# Patient Record
Sex: FEMALE | Race: WHITE | URBAN - METROPOLITAN AREA
[De-identification: names, ages, dates, MRNs, and addresses within clinical notes are randomized per-mention and may not be internally consistent; named-entity substitution may affect disease eponyms.]

---

## 2017-04-20 ENCOUNTER — PRE VISIT (OUTPATIENT)
Dept: OTOLARYNGOLOGY | Facility: CLINIC | Age: 52
End: 2017-04-20

## 2017-04-20 DIAGNOSIS — H91.90 HEARING LOSS: Primary | ICD-10-CM

## 2017-04-20 DIAGNOSIS — H80.90 OTOSCLEROSIS: Primary | ICD-10-CM

## 2017-04-20 NOTE — TELEPHONE ENCOUNTER
1.  Date/reason for appt:  4/26/17   Left Ear Hearing Loss    2.  Referring provider:  Self    3.  Call to patient (Yes / No - short description):  No,  indicates hasn't been seen elsewhere.  2006 Operative Report Dr oJseph  --  Records  are in [facility] and imaging is in PACS.

## 2017-04-26 ENCOUNTER — OFFICE VISIT (OUTPATIENT)
Dept: AUDIOLOGY | Facility: CLINIC | Age: 52
End: 2017-04-26

## 2017-04-26 ENCOUNTER — OFFICE VISIT (OUTPATIENT)
Dept: OTOLARYNGOLOGY | Facility: CLINIC | Age: 52
End: 2017-04-26

## 2017-04-26 VITALS — WEIGHT: 175 LBS | HEIGHT: 63 IN | BODY MASS INDEX: 31.01 KG/M2

## 2017-04-26 DIAGNOSIS — H90.6 MIXED HEARING LOSS, BILATERAL: Primary | ICD-10-CM

## 2017-04-26 DIAGNOSIS — H80.93 OTOSCLEROSIS, BILATERAL: Primary | ICD-10-CM

## 2017-04-26 RX ORDER — CALCIUM CARBONATE 500(1250)
500 TABLET ORAL 2 TIMES DAILY
COMMUNITY
End: 2017-05-12

## 2017-04-26 ASSESSMENT — PAIN SCALES - GENERAL: PAINLEVEL: NO PAIN (0)

## 2017-04-26 NOTE — MR AVS SNAPSHOT
After Visit Summary   2017    Nancy Elizondo    MRN: [id number]           Patient Information     Date Of Birth          1965        Visit Information        Provider Department      2017 8:00 AM Madyson Ochoa AuD [facility]        Today's Diagnoses     Mixed hearing loss, bilateral    -  1       Follow-ups after your visit        Who to contact     Please call your clinic at [phone] to:    Ask questions about your health    Make or cancel appointments    Discuss your medicines    Learn about your test results    Speak to your doctor   If you have compliments or concerns about an experience at your clinic, or if you wish to file a complaint, please contact [facility] Patient Relations at [phone] or email us at [email or web address]         Additional Information About Your Visit        MyChart Information     [employer] is an electronic gateway that provides easy, online access to your medical records. With TelemetryWeb, you can request a clinic appointment, read your test results, renew a prescription or communicate with your care team.     To sign up for [employer] visit the website at [email or web address]   You will be asked to enter the access code listed below, as well as some personal information. Please follow the directions to create your username and password.     Your access code is: [email or web address]  Expires: 2017  6:31 AM     Your access code will  in 90 days. If you need help or a new code, please contact your [facility] or call [phone] for assistance.        Care EveryWhere ID     This is your Care EveryWhere ID. This could be used by other organizations to access your [place] medical records  [id number]         Blood Pressure from Last 3 Encounters:   No data found for BP    Weight from Last 3 Encounters:   17 79.4 kg (175 lb)              We Performed the  Following     AUDIOGRAM/TYMPANOGRAM - INTERFACE     AUDIOLOGY ADULT REFERRAL     Cmpn Audiometry Thrshld Eval & Speech Recog (31804)     Reduced 52 - Tymps / Reflex   (29310)        Primary Care Provider    None Specified       No primary provider on file.        Thank you!     Thank you for choosing [facility]IOLOG[facility]  for your care. Our goal is always to provide you with excellent care. Hearing back from our patients is one way we can continue to improve our services. Please take a few minutes to complete the written survey that you may receive in the mail after your visit with us. Thank you!             Your Updated Medication List - Protect others around you: Learn how to safely use, store and throw away your medicines at www.disposemymeds.org.          This list is accurate as of: 4/26/17  9:09 AM.  Always use your most recent med list.                   Brand Name Dispense Instructions for use    CAFFEINE EXTRA STRENGTH PO      Take 200 mg by mouth       calcium carbonate 500 MG tablet    OS-DAYNA 500 mg [place]. Ca     Take 500 mg by mouth 2 times daily       EFFEXOR PO      Take 75 mg by mouth daily       IRON SUPPLEMENT PO          LISINOPRIL PO      Take 20 mg by mouth       MULTIVITAMIN ADULT PO          PROVIGIL PO      Take 200 mg by mouth 3 times daily

## 2017-04-26 NOTE — MR AVS SNAPSHOT
After Visit Summary   4/26/2017    Nancy Elizondo    MRN: [id number]           Patient Information     Date Of Birth          1965        Visit Information        Provider Department      4/26/2017 9:00 AM Suleiman Joseph MD [facility]        Today's Diagnoses     Otosclerosis    -  1      Care Instructions    You will have a CT done-- first floor. You will be called with the results.  Patient to review pre operative information.   Patient to schedule a pre-op physical with their primary MD or with our Preoperative Assessment Clinic within 30 days of the procedure.    Patient to avoid blood thinning medications 1 week prior to surgery (Ibuprofen, Aleve, Aspirin, fish oil )   Use the antiseptic scrub as directed.   You will need to schedule a post operative appointment for 3 weeks after surgery    Patient to call the ENT clinic with further questions or concerns:   ENT Triage Nurse  [phone] option 3  ENT appointment scheduling [phone] option 1  ENT surgery scheduling, [place] [phone]  ENT Nurse Carmen [phone]        Follow-ups after your visit        Your next 10 appointments already scheduled     Apr 26, 2017  9:40 AM CDT   CT TEMPORAL ORBITAL SELLA W/O CONTRAST with UCCT2   [facility] CT ([facility])    [address]  1st Floor  [place] [address] [phone]           Please bring any scans or X-rays taken at other hospitals, if similar tests were done. Also bring a list of your medicines, including vitamins, minerals and over-the-counter drugs. It is safest to leave personal items at home.  Be sure to tell your doctor:   If you have any allergies.   If there s any chance you are pregnant.   If you are breastfeeding.   If you have any special needs.  You do not need to do anything special to prepare.  Please wear loose clothing, such as a sweat suit or jogging clothes. Avoid snaps, zippers and other  "metal. We may ask you to undress and put on a hospital gown.              Future tests that were ordered for you today     Open Future Orders        Priority Expected Expires Ordered    CT Temporal Orbital Sella w/o Contrast Routine  2018            Who to contact     Please call your clinic at [phone] to:    Ask questions about your health    Make or cancel appointments    Discuss your medicines    Learn about your test results    Speak to your doctor   If you have compliments or concerns about an experience at your clinic, or if you wish to file a complaint, please contact [facility] Patient Relations at [phone] or email us at [email or web address]         Additional Information About Your Visit        [employer] Information     Jinn is an electronic gateway that provides easy, online access to your medical records. With Jinn, you can request a clinic appointment, read your test results, renew a prescription or communicate with your care team.     To sign up for Jinn visit the website at [email or web address]   You will be asked to enter the access code listed below, as well as some personal information. Please follow the directions to create your username and password.     Your access code is: [email or web address]  Expires: 2017  6:31 AM     Your access code will  in 90 days. If you need help or a new code, please contact your [facility] or call [phone] for assistance.        Care EveryWhere ID     This is your Care EveryWhere ID. This could be used by other organizations to access your [place] medical records  [id number]        Your Vitals Were     Height BMI (Body Mass Index)                1.6 m (5' 2.99\") 31.01 kg/m2           Blood Pressure from Last 3 Encounters:   No data found for BP    Weight from Last 3 Encounters:   17 79.4 kg (175 lb)              We Performed the Following     " Yajaira-Operative Worksheet        Primary Care Provider    None Specified       No primary provider on file.        Thank you!     Thank you for choosing [facility] EAR NOSE AND TH[facility]AT  for your care. Our goal is always to provide you with excellent care. Hearing back from our patients is one way we can continue to improve our services. Please take a few minutes to complete the written survey that you may receive in the mail after your visit with us. Thank you!             Your Updated Medication List - Protect others around you: Learn how to safely use, store and throw away your medicines at www.disposemymeds.org.          This list is accurate as of: 4/26/17  9:25 AM.  Always use your most recent med list.                   Brand Name Dispense Instructions for use    CAFFEINE EXTRA STRENGTH PO      Take 200 mg by mouth       calcium carbonate 500 MG tablet    OS-DAYNA 500 mg [place]. Ca     Take 500 mg by mouth 2 times daily       EFFEXOR PO      Take 75 mg by mouth daily       IRON SUPPLEMENT PO          LISINOPRIL PO      Take 20 mg by mouth       MULTIVITAMIN ADULT PO          PROVIGIL PO      Take 200 mg by mouth 3 times daily

## 2017-04-26 NOTE — LETTER
Date:May 2, 2017      Patient was self referred, no letter generated. Do not send.        [facility] Information

## 2017-04-26 NOTE — LETTER
4/26/2017       RE: Nancy Elizondo  [address]  [place] [address]     Dear Colleague,    Thank you for referring your patient, Nancy Elizondo, to the [facility] at [facility]. Please see a copy of my visit note below.    HISTORY OF PRESENT ILLNESS:  Nancy is a 51-year-old woman who is seen in followup after having had a stapes procedure more than 10 years ago.  The patient reports that she is just gradually and progressively noticed decreasing hearing and that she is unable to hear adequately.  She does not want to go to [employer] and does not want to socialize because of increasing communication problems.  The patient had a right stapes procedure and did not get immediate improvement hearing but several years later noted that the hearing in that ear seemed to improve rather dramatically and since then she has been able to use the right ear for many purposes.  The patient has otherwise been feeling well.  She does not have dizziness or vertigo.  She never described any taste change.      REVIEW OF SYSTEMS:  Fairly unremarkable.      MEDICATIONS:  I reviewed her medications and she is on lisinopril and Effexor.  The other medications are basically over-the-counter.      PHYSICAL EXAMINATION:  Examination today shows that she is healthy and appears relatively normal in appearance.  She is mildly anxious.  Exam shows that the Lazo lateralizes left.  Rinne is negative on both sides.  The exam of the ear canal and drum shows that I think she has a positive Schwartze sign on both ears.  The patient has no evidence of nystagmus.  Her facial nerve is a House-Brackmann I.  The gait appeared normal.         AUDIOGRAM:  Audiogram shows a mixed pattern hearing loss with both ears showing hearing difficulty.      IMPRESSION:  Otosclerosis more progressive on the unoperated left ear.      PLAN:  I talked with her about the pros and cons of hearing aids,  the use of implantable devices such as a bone-anchored hearing aid and finally the use of a left stapedotomy.  Her family history is positive and interestingly I had operated on her father for the same disease process and had successful surgery.  She considers her right ear to be successful; therefore, wants to go ahead with the left ear.  I again reviewed the risks and benefits with her.  I answered her questions and she is anxious to go ahead as soon as possible.      SL/boston         Again, thank you for allowing me to participate in the care of your patient.      Sincerely,    Suleiman Joseph MD

## 2017-04-26 NOTE — PROGRESS NOTES
HISTORY OF PRESENT ILLNESS:  Nancy is a 51-year-old woman who is seen in followup after having had a stapes procedure more than 10 years ago.  The patient reports that she is just gradually and progressively noticed decreasing hearing and that she is unable to hear adequately.  She does not want to go to Spirit[employer] and does not want to socialize because of increasing communication problems.  The patient had a right stapes procedure and did not get immediate improvement hearing but several years later noted that the hearing in that ear seemed to improve rather dramatically and since then she has been able to use the right ear for many purposes.  The patient has otherwise been feeling well.  She does not have dizziness or vertigo.  She never described any taste change.      REVIEW OF SYSTEMS:  Fairly unremarkable.      MEDICATIONS:  I reviewed her medications and she is on lisinopril and Effexor.  The other medications are basically over-the-counter.      PHYSICAL EXAMINATION:  Examination today shows that she is healthy and appears relatively normal in appearance.  She is mildly anxious.  Exam shows that the Lazo lateralizes left.  Rinne is negative on both sides.  The exam of the ear canal and drum shows that I think she has a positive Schwartze sign on both ears.  The patient has no evidence of nystagmus.  Her facial nerve is a House-Brackmann I.  The gait appeared normal.         AUDIOGRAM:  Audiogram shows a mixed pattern hearing loss with both ears showing hearing difficulty.      IMPRESSION:  Otosclerosis more progressive on the unoperated left ear.      PLAN:  I talked with her about the pros and cons of hearing aids, the use of implantable devices such as a bone-anchored hearing aid and finally the use of a left stapedotomy.  Her family history is positive and interestingly I had operated on her father for the same disease process and had successful surgery.  She considers her right ear to be successful;  therefore, wants to go ahead with the left ear.  I again reviewed the risks and benefits with her.  I answered her questions and she is anxious to go ahead as soon as possible.      SYED/boston

## 2017-04-26 NOTE — PATIENT INSTRUCTIONS
You will have a CT done-- first floor. You will be called with the results.  Patient to review pre operative information.   Patient to schedule a pre-op physical with their primary MD or with our Preoperative Assessment Clinic within 30 days of the procedure.    Patient to avoid blood thinning medications 1 week prior to surgery (Ibuprofen, Aleve, Aspirin, fish oil )   Use the antiseptic scrub as directed.   You will need to schedule a post operative appointment for 3 weeks after surgery    Patient to call the ENT clinic with further questions or concerns:   ENT Triage Nurse  [phone] option 3  ENT appointment scheduling [phone] option 1  ENT surgery scheduling, [place] [phone]  ENT Nurse Carmen [phone]

## 2017-04-26 NOTE — NURSING NOTE
Teaching Flowsheet - ENT   Relevant Diagnosis: otosclerosis  Teaching Topic:left stapedotomy  Person(s) involved in teaching:  Patient     Motivation Level:  Asks Questions:   Yes  Eager to Learn:   Yes  Cooperative:   Yes  Receptive (willing/able to accept information):   Yes  Comments: Reviewed pre-op H and P,  NPO prior to  surgery,  pre-op scrub (given Hibiclens)  Reviewed post-op  cares including  ear/wound care, activity and pain.     Patient demonstrates understanding of the following:  Reason for the appointment, diagnosis and treatment plan:   Yes  Knowledge of proper use of medications and conditions for which they are ordered (with special attention to potential side effects or drug interactions):  stop aspirin products 1 week before surgery Yes  Which situations necessitate calling provider and whom to contact:   Yes  Nutritional needs and diet plan:   Yes  Pain management techniques:   Yes  Patient instructed on hand hygiene:  Yes  How and/when to access community resources:   Yes     Infection Prevention:  Patient   demonstrates understanding of the following:  Surgical procedure site care taught Yes  Signs and symptoms of infection taught Yes  Wound care taught Yes  Instructional Materials Used/Given: pre- op booklet, ear surgery  handout and verbal  Instruction.  Pt takes care of mother in-law who has dementia.  Pt will be called with the CT results.

## 2017-04-26 NOTE — PROGRESS NOTES
AUDIOLOGY REPORT    SUMMARY: Audiology visit completed. See audiogram for results.      RECOMMENDATIONS: Follow-up with ENT.    Sanjeev Ritter  Audiologist  [place] License  #[id number]

## 2017-04-28 ENCOUNTER — CARE COORDINATION (OUTPATIENT)
Dept: OTOLARYNGOLOGY | Facility: CLINIC | Age: 52
End: 2017-04-28

## 2017-04-28 NOTE — PROGRESS NOTES
Dr. Joseph reviewed the CT done 4-26-17 - no change in surgical plan-  Left stapes scheduled for 5-15-17.  Pt notified of above message and understood.

## 2017-05-11 ENCOUNTER — APPOINTMENT (OUTPATIENT)
Dept: SURGERY | Facility: CLINIC | Age: 52
End: 2017-05-11

## 2017-05-12 ENCOUNTER — ANESTHESIA EVENT (OUTPATIENT)
Dept: SURGERY | Facility: AMBULATORY SURGERY CENTER | Age: 52
End: 2017-05-12

## 2017-05-15 ENCOUNTER — ANESTHESIA (OUTPATIENT)
Dept: SURGERY | Facility: AMBULATORY SURGERY CENTER | Age: 52
End: 2017-05-15

## 2017-05-15 ENCOUNTER — HOSPITAL ENCOUNTER (OUTPATIENT)
Facility: AMBULATORY SURGERY CENTER | Age: 52
End: 2017-05-15
Attending: OTOLARYNGOLOGY

## 2017-05-15 ENCOUNTER — SURGERY (OUTPATIENT)
Age: 52
End: 2017-05-15

## 2017-05-15 VITALS
BODY MASS INDEX: 29.02 KG/M2 | OXYGEN SATURATION: 94 % | HEART RATE: 93 BPM | HEIGHT: 64 IN | RESPIRATION RATE: 18 BRPM | SYSTOLIC BLOOD PRESSURE: 107 MMHG | WEIGHT: 170 LBS | DIASTOLIC BLOOD PRESSURE: 75 MMHG | TEMPERATURE: 98.6 F

## 2017-05-15 DIAGNOSIS — Z98.890 S/P EAR SURGERY: Primary | ICD-10-CM

## 2017-05-15 DEVICE — IMPLANTABLE DEVICE: Type: IMPLANTABLE DEVICE | Site: EAR | Status: FUNCTIONAL

## 2017-05-15 RX ORDER — ONDANSETRON 2 MG/ML
INJECTION INTRAMUSCULAR; INTRAVENOUS PRN
Status: DISCONTINUED | OUTPATIENT
Start: 2017-05-15 | End: 2017-05-15

## 2017-05-15 RX ORDER — ACETAMINOPHEN 325 MG/1
975 TABLET ORAL ONCE
Status: DISCONTINUED | OUTPATIENT
Start: 2017-05-15 | End: 2017-05-15 | Stop reason: HOSPADM

## 2017-05-15 RX ORDER — MEPERIDINE HYDROCHLORIDE 25 MG/ML
12.5 INJECTION INTRAMUSCULAR; INTRAVENOUS; SUBCUTANEOUS
Status: DISCONTINUED | OUTPATIENT
Start: 2017-05-15 | End: 2017-05-16 | Stop reason: HOSPADM

## 2017-05-15 RX ORDER — NALOXONE HYDROCHLORIDE 0.4 MG/ML
.1-.4 INJECTION, SOLUTION INTRAMUSCULAR; INTRAVENOUS; SUBCUTANEOUS
Status: DISCONTINUED | OUTPATIENT
Start: 2017-05-15 | End: 2017-05-16 | Stop reason: HOSPADM

## 2017-05-15 RX ORDER — ONDANSETRON 4 MG/1
4 TABLET, ORALLY DISINTEGRATING ORAL EVERY 30 MIN PRN
Status: DISCONTINUED | OUTPATIENT
Start: 2017-05-15 | End: 2017-05-16 | Stop reason: HOSPADM

## 2017-05-15 RX ORDER — BACITRACIN 500 [USP'U]/G
OINTMENT OPHTHALMIC PRN
Status: DISCONTINUED | OUTPATIENT
Start: 2017-05-15 | End: 2017-05-15 | Stop reason: HOSPADM

## 2017-05-15 RX ORDER — FENTANYL CITRATE 50 UG/ML
25-50 INJECTION, SOLUTION INTRAMUSCULAR; INTRAVENOUS EVERY 5 MIN PRN
Status: DISCONTINUED | OUTPATIENT
Start: 2017-05-15 | End: 2017-05-15 | Stop reason: HOSPADM

## 2017-05-15 RX ORDER — PROPOFOL 10 MG/ML
INJECTION, EMULSION INTRAVENOUS CONTINUOUS PRN
Status: DISCONTINUED | OUTPATIENT
Start: 2017-05-15 | End: 2017-05-15

## 2017-05-15 RX ORDER — GABAPENTIN 300 MG/1
300 CAPSULE ORAL ONCE
Status: COMPLETED | OUTPATIENT
Start: 2017-05-15 | End: 2017-05-15

## 2017-05-15 RX ORDER — OFLOXACIN 3 MG/ML
5 SOLUTION AURICULAR (OTIC) 2 TIMES DAILY
Qty: 10 ML | Refills: 1 | Status: SHIPPED | OUTPATIENT
Start: 2017-05-15 | End: 2017-05-22

## 2017-05-15 RX ORDER — ONDANSETRON 2 MG/ML
4 INJECTION INTRAMUSCULAR; INTRAVENOUS EVERY 30 MIN PRN
Status: DISCONTINUED | OUTPATIENT
Start: 2017-05-15 | End: 2017-05-16 | Stop reason: HOSPADM

## 2017-05-15 RX ORDER — SODIUM CHLORIDE, SODIUM LACTATE, POTASSIUM CHLORIDE, CALCIUM CHLORIDE 600; 310; 30; 20 MG/100ML; MG/100ML; MG/100ML; MG/100ML
INJECTION, SOLUTION INTRAVENOUS CONTINUOUS
Status: DISCONTINUED | OUTPATIENT
Start: 2017-05-15 | End: 2017-05-16 | Stop reason: HOSPADM

## 2017-05-15 RX ORDER — LIDOCAINE 40 MG/G
CREAM TOPICAL
Status: DISCONTINUED | OUTPATIENT
Start: 2017-05-15 | End: 2017-05-15 | Stop reason: HOSPADM

## 2017-05-15 RX ORDER — SODIUM CHLORIDE, SODIUM LACTATE, POTASSIUM CHLORIDE, AND CALCIUM CHLORIDE .6; .31; .03; .02 G/100ML; G/100ML; G/100ML; G/100ML
IRRIGANT IRRIGATION PRN
Status: DISCONTINUED | OUTPATIENT
Start: 2017-05-15 | End: 2017-05-15 | Stop reason: HOSPADM

## 2017-05-15 RX ORDER — EPHEDRINE SULFATE 50 MG/ML
INJECTION, SOLUTION INTRAMUSCULAR; INTRAVENOUS; SUBCUTANEOUS PRN
Status: DISCONTINUED | OUTPATIENT
Start: 2017-05-15 | End: 2017-05-15

## 2017-05-15 RX ORDER — HYDROCODONE BITARTRATE AND ACETAMINOPHEN 5; 325 MG/1; MG/1
1 TABLET ORAL EVERY 6 HOURS PRN
Qty: 20 TABLET | Refills: 0 | Status: SHIPPED | OUTPATIENT
Start: 2017-05-15 | End: 2018-02-14

## 2017-05-15 RX ORDER — EPINEPHRINE NASAL SOLUTION 1 MG/ML
SOLUTION NASAL PRN
Status: DISCONTINUED | OUTPATIENT
Start: 2017-05-15 | End: 2017-05-15 | Stop reason: HOSPADM

## 2017-05-15 RX ORDER — GABAPENTIN 300 MG/1
300 CAPSULE ORAL ONCE
Status: DISCONTINUED | OUTPATIENT
Start: 2017-05-15 | End: 2017-05-15 | Stop reason: HOSPADM

## 2017-05-15 RX ORDER — ACETAMINOPHEN 325 MG/1
975 TABLET ORAL ONCE
Status: COMPLETED | OUTPATIENT
Start: 2017-05-15 | End: 2017-05-15

## 2017-05-15 RX ORDER — SODIUM CHLORIDE, SODIUM LACTATE, POTASSIUM CHLORIDE, CALCIUM CHLORIDE 600; 310; 30; 20 MG/100ML; MG/100ML; MG/100ML; MG/100ML
INJECTION, SOLUTION INTRAVENOUS CONTINUOUS
Status: DISCONTINUED | OUTPATIENT
Start: 2017-05-15 | End: 2017-05-15 | Stop reason: HOSPADM

## 2017-05-15 RX ORDER — DEXAMETHASONE SODIUM PHOSPHATE 4 MG/ML
INJECTION, SOLUTION INTRA-ARTICULAR; INTRALESIONAL; INTRAMUSCULAR; INTRAVENOUS; SOFT TISSUE PRN
Status: DISCONTINUED | OUTPATIENT
Start: 2017-05-15 | End: 2017-05-15

## 2017-05-15 RX ORDER — PROPOFOL 10 MG/ML
INJECTION, EMULSION INTRAVENOUS PRN
Status: DISCONTINUED | OUTPATIENT
Start: 2017-05-15 | End: 2017-05-15

## 2017-05-15 RX ORDER — FENTANYL CITRATE 50 UG/ML
INJECTION, SOLUTION INTRAMUSCULAR; INTRAVENOUS PRN
Status: DISCONTINUED | OUTPATIENT
Start: 2017-05-15 | End: 2017-05-15

## 2017-05-15 RX ORDER — LIDOCAINE HYDROCHLORIDE 20 MG/ML
INJECTION, SOLUTION INFILTRATION; PERINEURAL PRN
Status: DISCONTINUED | OUTPATIENT
Start: 2017-05-15 | End: 2017-05-15

## 2017-05-15 RX ADMIN — Medication 200 MCG: at 08:20

## 2017-05-15 RX ADMIN — Medication 200 MCG: at 07:58

## 2017-05-15 RX ADMIN — BACITRACIN 1 APPLICATOR: 500 OINTMENT OPHTHALMIC at 07:51

## 2017-05-15 RX ADMIN — LIDOCAINE HYDROCHLORIDE 100 MG: 20 INJECTION, SOLUTION INFILTRATION; PERINEURAL at 07:17

## 2017-05-15 RX ADMIN — Medication 200 MCG: at 08:32

## 2017-05-15 RX ADMIN — Medication 200 MCG: at 08:47

## 2017-05-15 RX ADMIN — GABAPENTIN 300 MG: 300 CAPSULE ORAL at 06:43

## 2017-05-15 RX ADMIN — DEXAMETHASONE SODIUM PHOSPHATE 4 MG: 4 INJECTION, SOLUTION INTRA-ARTICULAR; INTRALESIONAL; INTRAMUSCULAR; INTRAVENOUS; SOFT TISSUE at 07:37

## 2017-05-15 RX ADMIN — EPINEPHRINE NASAL SOLUTION 15 ML: 1 SOLUTION NASAL at 08:44

## 2017-05-15 RX ADMIN — PROPOFOL: 10 INJECTION, EMULSION INTRAVENOUS at 08:07

## 2017-05-15 RX ADMIN — Medication 200 MCG: at 07:34

## 2017-05-15 RX ADMIN — SODIUM CHLORIDE, SODIUM LACTATE, POTASSIUM CHLORIDE, CALCIUM CHLORIDE: 600; 310; 30; 20 INJECTION, SOLUTION INTRAVENOUS at 08:30

## 2017-05-15 RX ADMIN — PROPOFOL 180 MG: 10 INJECTION, EMULSION INTRAVENOUS at 07:17

## 2017-05-15 RX ADMIN — Medication 200 MCG: at 08:11

## 2017-05-15 RX ADMIN — ACETAMINOPHEN 975 MG: 325 TABLET ORAL at 06:42

## 2017-05-15 RX ADMIN — EPHEDRINE SULFATE 10 MG: 50 INJECTION, SOLUTION INTRAMUSCULAR; INTRAVENOUS; SUBCUTANEOUS at 08:06

## 2017-05-15 RX ADMIN — EPHEDRINE SULFATE 15 MG: 50 INJECTION, SOLUTION INTRAMUSCULAR; INTRAVENOUS; SUBCUTANEOUS at 07:30

## 2017-05-15 RX ADMIN — SODIUM CHLORIDE, SODIUM LACTATE, POTASSIUM CHLORIDE, CALCIUM CHLORIDE: 600; 310; 30; 20 INJECTION, SOLUTION INTRAVENOUS at 07:03

## 2017-05-15 RX ADMIN — PROPOFOL 100 MCG/KG/MIN: 10 INJECTION, EMULSION INTRAVENOUS at 07:14

## 2017-05-15 RX ADMIN — SODIUM CHLORIDE, SODIUM LACTATE, POTASSIUM CHLORIDE, AND CALCIUM CHLORIDE 500 ML: .6; .31; .03; .02 IRRIGANT IRRIGATION at 07:52

## 2017-05-15 RX ADMIN — ONDANSETRON 4 MG: 2 INJECTION INTRAMUSCULAR; INTRAVENOUS at 08:54

## 2017-05-15 RX ADMIN — FENTANYL CITRATE 100 MCG: 50 INJECTION, SOLUTION INTRAMUSCULAR; INTRAVENOUS at 07:19

## 2017-05-15 RX ADMIN — Medication 200 MCG: at 07:48

## 2017-05-15 RX ADMIN — Medication 200 MCG: at 08:38

## 2017-05-15 NOTE — IP AVS SNAPSHOT
MRN:[id number]                      After Visit Summary   5/15/2017    Nancy Elizondo    MRN: [id number]           Thank you!     Thank you for choosing [facility] for your care. Our goal is always to provide you with excellent care. Hearing back from our patients is one way we can continue to improve our services. Please take a few minutes to complete the written survey that you may receive in the mail after you visit with us. Thank you!        Patient Information     Date Of Birth          1965        About your hospital stay     You were admitted on:  May 15, 2017 [facility]    You were discharged on:  May 15, 2017       Who to Call     For medical emergencies, please call 911.  For non-urgent questions about your medical care, please call your primary care provider or clinic, [phone]  For questions related to your surgery, please call your surgery clinic        Attending Provider     Provider Specialty    Suleiman Joseph MD Otolaryngology       Primary Care Provider Office Phone # Fax #    [facility] [phone]       [address] [place] [id number]        After Care Instructions     Diet Instructions       Resume pre procedure diet            Discharge Instructions       Patient to follow up with surgeon on 6/7/17 as previously scheduled.            Discharge Instructions - Lifting restrictions       Lifting Restrictions 10 pounds until seen at Post-op follow up appointment            No blowing nose           Wound care       - Leave cotton ball in ear canal x 48h, replace if becomes saturated (some blood tinged drainage is normal)  - leave inner ear canal packing in place until follow up  - No shower x 48h post-op. Use Vaseline coated cotton ball in ear canal to keep water out  - Follow-up as previously scheduled in 3 weeks.   - Use the Floxin ear drops to your operative ear for one week after  "cotton ball has been removed in 48 hours.                  Your next 10 appointments already scheduled     Jun 07, 2017 11:45 AM CDT   (Arrive by 11:30 AM)   Return Visit with Suleiman Joseph MD   [facility] ([facility])    [address] [place] [id number] [phone]              Further instructions from your care team       [facility]  Home Care Following Anesthesia  For 24 hours after surgery:  1. Get plenty of rest.  A responsible adult must stay with you for at least 24 hours after you leave the surgery center.  2. Do not drive or use heavy equipment.  If you have weakness or tingling, don't drive or use heavy equipment until this feeling goes away.   3. Do not drink alcohol.   4. Avoid strenuous or risky activities.  Ask for help when climbing stairs.  5. You may feel lightheaded.  IF so, sit for a few minutes before standing.  Have someone help you get up.   6. If you have nausea (feel sick to your stomach): Drink only clear liquids such as apple juice, ginger ale, broth or 7-Up.  Rest may also help.  Be sure to drink enough fluids.  Move to a regular diet as you feel able.   7. You may have a slight fever.  Call the doctor if your fever is over 100 F (37.7 C) (taken under the tongue) or lasts longer than 24 hours.  8. You may have a dry mouth, a sore throat, muscle aches or trouble sleeping. These should go away after 24 hours.  9. Do not make important or legal decisions.        Today you received a Marcaine or bupivacaine block to numb the nerves near your surgery site.  This is a block using local anesthetic or \"numbing\" medication injected around the nerves to anesthetize or \"numb\" the area supplied by those nerves.  This block is injected into the muscle layer near your surgical site.  The medication may numb the location where you had surgery for 6-18 hours, but may last up to 24 hours.  If " your surgical site is an arm or leg you should be careful with your affected limb, since it is possible to injure your limb without being aware of it due to the numbing.  Until full feeling returns, you should guard against bumping or hitting your limb, and avoid extreme hot or cold temperatures on the skin.  As the block wears off, the feeling will return as a tingling or prickly sensation near your surgical site.  You will experience more discomfort from your incision as the feeling returns.  You may want to take a pain pill (a narcotic or Tylenol if this was prescribed by your surgeon) when you start to experience mild pain before the pain beccomes more severe.  If your pain medications do not control your pain you should notifiy your surgeon.    Tips for taking pain medications  To get the best pain relief possible, remember these points:    Take pain medications as directed, before pain becomes severe.    Pain medication can upset your stomach: taking it with food may help.    Constipation is a common side effect of pain medication. Drink plenty of  fluids.    Eat foods high in fiber. Take a stool softener if recommended by your doctor or pharmacist.    Do not drink alcohol, drive or operate machinery while taking pain medications.    Ask about other ways to control pain, such as with heat, ice or relaxation.    Call a doctor for any of the followin. Signs of infection (fever, growing tenderness at the surgery site, a large amount of drainage or bleeding, severe pain, foul-smelling drainage, redness, swelling).  2. It has been over 8 to 10 hours since surgery and you are still not able to urinate (pass water).  3. Headache for over 24 hours.  4. Numbness, tingling or weakness the day after surgery (if you had spinal anesthesia).  Your doctor is:  Dr. Suleiman Joseph, ENT Otolaryngology: [phone]  Or dial [phone] and ask for the resident on call for:  ENT Otolaryngology  For emergency care, call the:  " [facility]:  [phone] (TTY for hearing impaired: [phone])                Pending Results     No orders found from 2017 to 2017.            Admission Information     Date & Time Provider Department Dept. Phone    5/15/2017 Suleiman Joseph MD [facility] [phone]      Your Vitals Were     Blood Pressure Pulse Temperature Respirations Height Weight    114/80 93 98.1  F (36.7  C) (Temporal) 18 1.626 m (5' 4\") 77.1 kg (170 lb)    Last Period Pulse Oximetry BMI (Body Mass Index)             05/15/2017 92% 29.18 kg/m2         MyChart Information     [employer] is an electronic gateway that provides easy, online access to your medical records. With [employer], you can request a clinic appointment, read your test results, renew a prescription or communicate with your care team.     To sign up for [employer] visit the website at [email or web address]   You will be asked to enter the access code listed below, as well as some personal information. Please follow the directions to create your username and password.     Your access code is: [email or web address]  Expires: 2017  6:31 AM     Your access code will  in 90 days. If you need help or a new code, please contact your [facility] or call [phone] for assistance.        Care EveryWhere ID     This is your Care EveryWhere ID. This could be used by other organizations to access your [facility] medical records  [id number]           Review of your medicines      START taking        Dose / Directions    HYDROcodone-acetaminophen 5-325 MG per tablet   Commonly known as:  NORCO   Used for:  S/P ear surgery        Dose:  1 tablet   Take 1 tablet by mouth every 6 hours as needed for moderate to severe pain   Quantity:  20 tablet   Refills:  0       ofloxacin 0.3 % otic solution   Commonly known as:  FLOXIN   Used for:  S/P ear surgery        Dose:  5 drop   Place 5 drops Into the " left ear 2 times daily for 7 days   Quantity:  10 mL   Refills:  1         CONTINUE these medicines which have NOT CHANGED        Dose / Directions    CAFFEINE EXTRA STRENGTH PO        Dose:  200 mg   Take 200 mg by mouth   Refills:  0       EFFEXOR PO        Dose:  37.5 mg   Take 37.5 mg by mouth daily 1 or 2 daily   Refills:  0       LISINOPRIL PO        Dose:  20 mg   Take 20 mg by mouth   Refills:  0       MULTIVITAMIN ADULT PO        Refills:  0       PROVIGIL PO        Dose:  200 mg   Take 200 mg by mouth 3 times daily   Refills:  0            Where to get your medicines      These medications were sent to [place], [place] - [address] [facility] [address] [facility] [address]  [address], [facility] [address]    Hours:  TRANSPLANT PHONE NUMBER [phone] Phone:  [phone]     ofloxacin 0.3 % otic solution         Some of these will need a paper prescription and others can be bought over the counter. Ask your nurse if you have questions.     Bring a paper prescription for each of these medications     HYDROcodone-acetaminophen 5-325 MG per tablet                Protect others around you: Learn how to safely use, store and throw away your medicines at www.disposemymeds.org.             Medication List: This is a list of all your medications and when to take them. Check marks below indicate your daily home schedule. Keep this list as a reference.      Medications           Morning Afternoon Evening Bedtime As Needed    CAFFEINE EXTRA STRENGTH PO   Take 200 mg by mouth                                EFFEXOR PO   Take 37.5 mg by mouth daily 1 or 2 daily                                HYDROcodone-acetaminophen 5-325 MG per tablet   Commonly known as:  NORCO   Take 1 tablet by mouth every 6 hours as needed for moderate to severe pain                                LISINOPRIL PO   Take 20 mg by mouth                                MULTIVITAMIN ADULT PO                                 ofloxacin 0.3 % otic solution   Commonly known as:  FLOXIN   Place 5 drops Into the left ear 2 times daily for 7 days                                PROVIGIL PO   Take 200 mg by mouth 3 times daily

## 2017-05-15 NOTE — OP NOTE
DATE OF SERVICE:  05/15/2017      PREOPERATIVE DIAGNOSIS:  Left-sided conductive hearing loss with possible otosclerosis.      POSTOPERATIVE DIAGNOSIS:  Left conductive hearing loss with evidence of otosclerosis.      PROCEDURES PERFORMED:  Left stapedectomy and stapes prosthesis placement.      STAFF SURGEON:  Suleiman Joseph MD      RESIDENT SURGEON:  Evangelista Nails MD      ANESTHESIA:  General.      ESTIMATED BLOOD LOSS:  2 mL.      SPECIMENS:  Stapes for gross pathological examination.      FINDINGS:  The patient's stapes was noted to be fixed upon palpation.  However, the lateral chain was mobile upon palpation.  A total stapedectomy was performed secondary to mobility of the entire footplate.  A 4.25 Big Easy stapes prosthesis was placed.      CLINICAL INDICATIONS FOR PROCEDURE:  Ms. Nancy Elizondo is a 51-year-old female with a history of bilateral otosclerosis, who had previously undergone a right-sided stapedotomy surgery several years ago.  Her audiogram was consistent with a bilateral mixed pattern hearing loss.  After discussing multiple options including hearing aid, implantable devices and a bone-anchored hearing aid, she elected to proceed with a left-sided stapes surgery.  Her CT scan demonstrated evidence of otosclerosis on the left side, and she elected to proceed.      DESCRIPTION OF PROCEDURE:  After the patient was identified in the preholding area and informed consent was obtained, she was brought to the operating room, placed on the table in supine position.  Anesthesia personnel achieved adequate sedation via general anesthesia and subsequently orotracheally intubated the patient without any issues.  The head of bed was then turned 180 degrees.  Facial nerve monitoring leads were placed in the orbicularis oris and orbicularis oculi muscles and tap testing was performed and impedance levels were checked to confirm appropriate connection with the facial nerve monitor.  The patient was then prepped  and draped in the usual fashion.  We injected 1:100,000 epinephrine in the postauricular area as well as the conchal bowl, tragal cartilage, as well as lobule.      Canal injections were also performed with 1:100,000 epinephrine using the operating microscope.  After appropriate time had passed to allow for injection to take effect, we were able to visualize the tympanic membrane.  We copiously irrigated the ear canal with saline.  We then performed canal incisions using a 7200 Bristol blade, creating a standard utility flap.  A #2 canal knife was then used to elevate the tympanomeatal flap down to the level of annulus.  The middle ear space was then entered.  The ossicular chain was visualized and upon palpation of the lateral chain, the incus and malleus appeared mobile.  The stapes, however, was completely fixed with notable hypervascularity noted on the promontory.  We were able to adequately visualize the tympanic segment of the facial nerve, the pyramidal process, stapedial tendon and the entirety of the footplate.  The incudostapedial joint was then disconnected with an IS joint knife.  The stapedial tendon was then excised using the laser at 650 milliwatts at 200 milliseconds.  The posterior naya of the stapes was then taken down with the laser and a straight pick.  Once the posterior naya was removed, there was notable mobility of the now fractured stapes footplate.  At this point, we elected to perform a total stapedectomy secondary to the footplate mobility.  We removed the stapes superstructure and the entire footplate.      We then turned to make a tragal incision on the posterior aspect of the tragus.  An incision was made through the tragal cartilage and perichondrium was elevated off of the cartilage and excised and passed off the table to use as a graft.  During our graft harvest, we packed the oval window with epinephrine-soaked Gelfoam.  We then sized our prosthesis, and we then placed the graft  directly over the oval window and a size 4.25 mm Big Easy prosthesis was then placed directly over the graft.  The prosthesis was then placed over the long process of the incus into the stapedotomy site.  Confirmation of placement of the prosthesis was performed by palpating the ossicular chain and there was good mobility of the prosthesis within the stapedectomy over the graft.  The tympanomeatal flap was then laid back down and Gelfoam was placed at the ear canal incisions.  Bacitracin was then used to fill the ear canal.  A cotton ball and Band-Aid was then placed as a dressing.  The patient's care was then turned back to the Anesthesia team, who awoke and extubated the patient without any issues.      Staff surgeon, Dr. Clint Joseph, was present and scrubbed for all portions of the procedure.  The patient was then transported to the PACU in stable condition.         CLINT JSOEPH MD       As dictated by ROSA DOMINGO MD            D: 05/15/2017 11:15   T: 05/15/2017 18:17   MT: al      Name:     PATRICK FRANKEL   MRN:      [id number]        Account:        [id number]   :      1965           Procedure Date: 05/15/2017      Document: [id number]      I was present for the entire procedure between opening and closing.    Clint Joseph  Date of Service (when I saw the patient): 5/15/2017

## 2017-05-15 NOTE — BRIEF OP NOTE
[facility] Brief Operative Note    Pre-operative diagnosis: Otosclerosis   Post-operative diagnosis * No post-op diagnosis entered *   Procedure: Procedure(s):  Left Ear Stapedotomy with Facial Nerve Monitoring, and Diode Laser  - Wound Class: I-Clean   Surgeon: Frank Joseph MD   Assistants(s): Evangelista Nails MD   Estimated blood loss: Minimal    Specimens: Left stapes for gross only.    Findings: Left total stapedectomy performed with perichondrial graft. 4.25 mm Big Easy prosthesis placed.

## 2017-05-15 NOTE — ANESTHESIA CARE TRANSFER NOTE
Patient: Nancy Elizondo    Procedure(s):  Left Ear Stapedotomy with Facial Nerve Monitoring, and Diode Laser  - Wound Class: I-Clean    Diagnosis: Otosclerosis  Diagnosis Additional Information: No value filed.    Anesthesia Type:   General, ETT     Note:  Airway :Face Mask and Oral Airway  Patient transferred to:PACU  Comments: VSS/WNL. Responds to name.      Vitals: (Last set prior to Anesthesia Care Transfer)    CRNA VITALS  5/15/2017 0840 - 5/15/2017 0916      5/15/2017             Pulse: 94    SpO2: 96 %                Electronically Signed By: NOLVIA Prince CRNA  May 15, 2017  9:16 AM

## 2017-05-15 NOTE — ANESTHESIA PREPROCEDURE EVALUATION
Anesthesia Evaluation     .             ROS/MED HX    ENT/Pulmonary:  - neg pulmonary ROS     Neurologic:  - neg neurologic ROS     Cardiovascular:     (+) hypertension----. : . . . :. .       METS/Exercise Tolerance:     Hematologic:  - neg hematologic  ROS       Musculoskeletal:  - neg musculoskeletal ROS       GI/Hepatic:  - neg GI/hepatic ROS       Renal/Genitourinary:  - ROS Renal section negative       Endo:     (+) thyroid problem hypothyroidism, .      Psychiatric:  - neg psychiatric ROS       Infectious Disease:  - neg infectious disease ROS       Malignancy:      - no malignancy   Other:    - neg other ROS                 Physical Exam  Normal systems: cardiovascular, pulmonary and dental    Airway   Mallampati: I  TM distance: >3 FB  Neck ROM: full    Dental     Cardiovascular   Rhythm and rate: regular and normal      Pulmonary    breath sounds clear to auscultation                    Anesthesia Plan      History & Physical Review  History and physical reviewed and following examination; no interval change.    ASA Status:  2 .    NPO Status:  > 8 hours    Plan for General and ETT with Intravenous and Propofol induction. Maintenance will be TIVA.    PONV prophylaxis:  Ondansetron (or other 5HT-3) and Dexamethasone or Solumedrol       Postoperative Care  Postoperative pain management:  IV analgesics.      Consents  Anesthetic plan, risks, benefits and alternatives discussed with:  Patient..                          .

## 2017-05-15 NOTE — DISCHARGE INSTRUCTIONS
"[facility]  Home Care Following Anesthesia  For 24 hours after surgery:  1. Get plenty of rest.  A responsible adult must stay with you for at least 24 hours after you leave the surgery center.  2. Do not drive or use heavy equipment.  If you have weakness or tingling, don't drive or use heavy equipment until this feeling goes away.   3. Do not drink alcohol.   4. Avoid strenuous or risky activities.  Ask for help when climbing stairs.  5. You may feel lightheaded.  IF so, sit for a few minutes before standing.  Have someone help you get up.   6. If you have nausea (feel sick to your stomach): Drink only clear liquids such as apple juice, ginger ale, broth or 7-Up.  Rest may also help.  Be sure to drink enough fluids.  Move to a regular diet as you feel able.   7. You may have a slight fever.  Call the doctor if your fever is over 100 F (37.7 C) (taken under the tongue) or lasts longer than 24 hours.  8. You may have a dry mouth, a sore throat, muscle aches or trouble sleeping. These should go away after 24 hours.  9. Do not make important or legal decisions.        Today you received a Marcaine or bupivacaine block to numb the nerves near your surgery site.  This is a block using local anesthetic or \"numbing\" medication injected around the nerves to anesthetize or \"numb\" the area supplied by those nerves.  This block is injected into the muscle layer near your surgical site.  The medication may numb the location where you had surgery for 6-18 hours, but may last up to 24 hours.  If your surgical site is an arm or leg you should be careful with your affected limb, since it is possible to injure your limb without being aware of it due to the numbing.  Until full feeling returns, you should guard against bumping or hitting your limb, and avoid extreme hot or cold temperatures on the skin.  As the block wears off, the feeling will return as a tingling or prickly sensation near your " surgical site.  You will experience more discomfort from your incision as the feeling returns.  You may want to take a pain pill (a narcotic or Tylenol if this was prescribed by your surgeon) when you start to experience mild pain before the pain beccomes more severe.  If your pain medications do not control your pain you should notifiy your surgeon.    Tips for taking pain medications  To get the best pain relief possible, remember these points:    Take pain medications as directed, before pain becomes severe.    Pain medication can upset your stomach: taking it with food may help.    Constipation is a common side effect of pain medication. Drink plenty of  fluids.    Eat foods high in fiber. Take a stool softener if recommended by your doctor or pharmacist.    Do not drink alcohol, drive or operate machinery while taking pain medications.    Ask about other ways to control pain, such as with heat, ice or relaxation.    Call a doctor for any of the followin. Signs of infection (fever, growing tenderness at the surgery site, a large amount of drainage or bleeding, severe pain, foul-smelling drainage, redness, swelling).  2. It has been over 8 to 10 hours since surgery and you are still not able to urinate (pass water).  3. Headache for over 24 hours.  4. Numbness, tingling or weakness the day after surgery (if you had spinal anesthesia).  Your doctor is:  Dr. Suleiman Joseph, ENT Otolaryngology: [phone]  Or dial [phone] and ask for the resident on call for:  ENT Otolaryngology  For emergency care, call the:  [facility]:  [phone] (TTY for hearing impaired: [phone])

## 2017-05-17 LAB — COPATH REPORT: NORMAL

## 2017-06-07 ENCOUNTER — OFFICE VISIT (OUTPATIENT)
Dept: OTOLARYNGOLOGY | Facility: CLINIC | Age: 52
End: 2017-06-07

## 2017-06-07 VITALS — HEIGHT: 63 IN | WEIGHT: 172 LBS | BODY MASS INDEX: 30.48 KG/M2

## 2017-06-07 DIAGNOSIS — B37.0 THRUSH: Primary | ICD-10-CM

## 2017-06-07 RX ORDER — NYSTATIN 100000/ML
SUSPENSION, ORAL (FINAL DOSE FORM) ORAL
Qty: 280 ML | Refills: 0 | Status: SHIPPED | OUTPATIENT
Start: 2017-06-07 | End: 2017-06-14

## 2017-06-07 ASSESSMENT — PAIN SCALES - GENERAL: PAINLEVEL: MODERATE PAIN (5)

## 2017-06-07 NOTE — LETTER
6/7/2017       RE: Nancy Elizondo  [address] [facility] [address]     Dear Colleague,    Thank you for referring your patient, Nancy Elizondo, to the [facility] at [facility]. Please see a copy of my visit note below.    HISTORY OF PRESENT ILLNESS:  Ms. Elizondo is a 51-year-old woman who is seen in followup after having had stapes surgery on the left side.  The patient reports that she had no dizziness but complains of dysgeusia on the left side and soreness of her entire mouth.  The patient reported swallowing has been difficult and that she experiences discomfort with it.  The patient has not gotten hearing back on the side but reports that the ear feels full and somewhat pressurized.      PHYSICAL EXAMINATION:  Examination today shows that the left canal is filled with blood.  I removed it with alligator forceps which has dried.  Small amounts of bacitracin were also removed.  Drum is anatomically intact and looks healthy.  The middle ear shows no evidence of effusion.  Her facial is a House-Brackmann I.  Examination of her oral cavity shows signs of thrush posteriorly in the tongue.  The remainder of her dentition looks normal.  I do not see any evidence for a problem of drying.  The mucosal surfaces are all healthy.  There is no evidence of nystagmus.  My overall impression is that she is doing better.  Lazo is midline.  Rinne is positive, that is air is better than bone on the left.      PLAN:  I will wait six weeks, get an audio and plan to see her back.  I would also start some nystatin for the possibility that the thrush is causing some of her symptoms.      SL/boston     Sincerely,    Suleiman Joseph MD

## 2017-06-07 NOTE — PROGRESS NOTES
HISTORY OF PRESENT ILLNESS:  Ms. Elizondo is a 51-year-old woman who is seen in followup after having had stapes surgery on the left side.  The patient reports that she had no dizziness but complains of dysgeusia on the left side and soreness of her entire mouth.  The patient reported swallowing has been difficult and that she experiences discomfort with it.  The patient has not gotten hearing back on the side but reports that the ear feels full and somewhat pressurized.      PHYSICAL EXAMINATION:  Examination today shows that the left canal is filled with blood.  I removed it with alligator forceps which has dried.  Small amounts of bacitracin were also removed.  Drum is anatomically intact and looks healthy.  The middle ear shows no evidence of effusion.  Her facial is a House-Brackmann I.  Examination of her oral cavity shows signs of thrush posteriorly in the tongue.  The remainder of her dentition looks normal.  I do not see any evidence for a problem of drying.  The mucosal surfaces are all healthy.  There is no evidence of nystagmus.  My overall impression is that she is doing better.  Lazo is midline.  Rinne is positive, that is air is better than bone on the left.      PLAN:  I will wait six weeks, get an audio and plan to see her back.  I would also start some nystatin for the possibility that the thrush is causing some of her symptoms.      SYED/[place]

## 2017-06-07 NOTE — PATIENT INSTRUCTIONS
You will need  to schedule a follow up appointment in 6 -8 weeks with an audio   Please call our clinic for any questions,concerns,or worsening symptoms.      Clinic #[phone]       Option 3  for the triage nurse.

## 2017-06-07 NOTE — MR AVS SNAPSHOT
After Visit Summary   6/7/2017    Nancy Elizondo    MRN: [id number]           Patient Information     Date Of Birth          1965        Visit Information        Provider Department      6/7/2017 11:45 AM Suleiman Joseph MD M [facility] Ear Nose and Throat        Today's Diagnoses     Thrush    -  1      Care Instructions    You will need  to schedule a follow up appointment in 6 -8 weeks with an audio   Please call our clinic for any questions,concerns,or worsening symptoms.      Clinic #[phone]       Option 3  for the triage nurse.          Follow-ups after your visit        Your next 10 appointments already scheduled     Jul 26, 2017 10:00 AM CDT   Walk In From ENT with Sanjeev Boswell [facility] ([facility])    [address] [place] [address] [phone]            Jul 26, 2017 11:30 AM CDT   (Arrive by 11:15 AM)   Return Visit with MD VALDEZ Pierce [facility] Nose and Throat ([facility])    [address] [place] [address] [phone]              Who to contact     Please call your clinic at [phone] to:    Ask questions about your health    Make or cancel appointments    Discuss your medicines    Learn about your test results    Speak to your doctor   If you have compliments or concerns about an experience at your clinic, or if you wish to file a complaint, please contact [facility] Physicians Patient Relations at [phone] or email us at [email or web address]         Additional Information About Your Visit        Desktonehart Information     Hot Mix Mobile is an electronic gateway that provides easy, online access to your medical records. With Hot Mix Mobile, you can request a clinic appointment, read your test results, renew a prescription or communicate with your care team.     To sign up for Hot Mix Mobile visit the website at  "[email or web address]   You will be asked to enter the access code listed below, as well as some personal information. Please follow the directions to create your username and password.     Your access code is: [email or web address]  Expires: 2017  6:31 AM     Your access code will  in 90 days. If you need help or a new code, please contact your [facility] or call [phone] for assistance.        Care EveryWhere ID     This is your Care EveryWhere ID. This could be used by other organizations to access your [place] medical records  [id number]        Your Vitals Were     Height Last Period BMI (Body Mass Index)             1.61 m (5' 3.39\") 05/15/2017 30.1 kg/m2          Blood Pressure from Last 3 Encounters:   05/15/17 107/75    Weight from Last 3 Encounters:   17 78 kg (172 lb)   05/15/17 77.1 kg (170 lb)   17 79.4 kg (175 lb)              Today, you had the following     No orders found for display         Today's Medication Changes          These changes are accurate as of: 17 12:31 PM.  If you have any questions, ask your nurse or doctor.               Start taking these medicines.        Dose/Directions    nystatin 980275 UNIT/ML suspension   Commonly known as:  MYCOSTATIN   Used for:  Thrush   Started by:  Suleiman Joseph MD        Take 1 teaspoonful by mouth 4 times daily for 5 days   Quantity:  280 mL   Refills:  0            Where to get your medicines      These medications were sent to [facility] [id number] [place] [address]  [facility]   [facility] [address]     Phone:  [phone]     nystatin 539749 UNIT/ML suspension                Primary Care Provider Office Phone # Fax #    [facility] [phone]       [address] [place], [place] [address]ivelisse [facility] [address]        Thank you!     Thank you for choosing [facility] EAR NOSE AND THROAT  for your care. Our goal is always to " provide you with excellent care. Hearing back from our patients is one way we can continue to improve our services. Please take a few minutes to complete the written survey that you may receive in the mail after your visit with us. Thank you!             Your Updated Medication List - Protect others around you: Learn how to safely use, store and throw away your medicines at www.disposemymeds.org.          This list is accurate as of: 6/7/17 12:31 PM.  Always use your most recent med list.                   Brand Name Dispense Instructions for use    CAFFEINE EXTRA STRENGTH PO      Take 200 mg by mouth       EFFEXOR PO      Take 37.5 mg by mouth daily 1 or 2 daily       HYDROcodone-acetaminophen 5-325 MG per tablet    NORCO    20 tablet    Take 1 tablet by mouth every 6 hours as needed for moderate to severe pain       LISINOPRIL PO      Take 20 mg by mouth       MULTIVITAMIN ADULT PO          nystatin 076884 UNIT/ML suspension    MYCOSTATIN    280 mL    Take 1 teaspoonful by mouth 4 times daily for 5 days       PROVIGIL PO      Take 200 mg by mouth 3 times daily

## 2017-07-24 DIAGNOSIS — H80.90 OTOSCLEROSIS: Primary | ICD-10-CM

## 2017-07-26 ENCOUNTER — OFFICE VISIT (OUTPATIENT)
Dept: AUDIOLOGY | Facility: CLINIC | Age: 52
End: 2017-07-26

## 2017-07-26 ENCOUNTER — OFFICE VISIT (OUTPATIENT)
Dept: OTOLARYNGOLOGY | Facility: CLINIC | Age: 52
End: 2017-07-26

## 2017-07-26 VITALS — BODY MASS INDEX: 29.23 KG/M2 | HEIGHT: 63 IN | WEIGHT: 165 LBS

## 2017-07-26 DIAGNOSIS — H90.6 MIXED HEARING LOSS, BILATERAL: Primary | ICD-10-CM

## 2017-07-26 DIAGNOSIS — K14.3 TONGUE COATING: Primary | ICD-10-CM

## 2017-07-26 DIAGNOSIS — H80.93 OTOSCLEROSIS, BILATERAL: ICD-10-CM

## 2017-07-26 LAB
FUNGUS SPEC CULT: NORMAL
MICRO REPORT STATUS: NORMAL
SPECIMEN SOURCE: NORMAL

## 2017-07-26 ASSESSMENT — PAIN SCALES - GENERAL: PAINLEVEL: NO PAIN (0)

## 2017-07-26 NOTE — PROGRESS NOTES
AUDIOLOGY REPORT    SUMMARY: Audiology visit completed. See audiogram for results.      RECOMMENDATIONS: Follow-up with ENT.      Sanjeev Ritter  Audiologist  [place] License  #[id number]

## 2017-07-26 NOTE — PROGRESS NOTES
History of Present Illness:  This is a 52-year-old woman who recently underwent left stapes surgery. Her procedure was on May 15 of this year. She reports that there is some improvement in her hearing on the left side, but it has been slow and rather gradual. She does not have any symptoms of dizziness or vertigo. She does complain of significant dysgeusia.    Exam: Exam today under the operating microscope shows both tympanic membranes looked anatomically normal. Her Lazo lateralizes to the right and her Rinne is negative on the right and positive on the left.  That air conduction is better than bone on the left. The patient now reports that the left ear is the better hearing ear. There is no evidence of nystagmus. The facial nerve is a House Brackmann class I bilaterally.    Medications:  There has been no change in medication. I had given her a prescription for nystatin which has not significantly improved the white plaque on the tongue. It is greater on the left side than on the right side.    Audiogram: Audiogram confirms that hearing threshold has improved slightly in the left ear, although it is not dramatic. Her speech reception threshold is 30 on the left, 50 on the right. Her word recognition remains 100% bilaterally.    Impression: Normal healing following left stapes surgery. This is an acceptable, although not outstanding, result.    Plan: Some of her symptoms of tongue coating may be from changes in secretion level. I have taken a scraping again today and sent it for fungal culture. I am unclear whether this really represents oral thrush. After the nystatin, I suspect that this has resolved and that her dysgeusia is on the basis of manipulation of the chorda tympani nerve. I will continue to follow her clinically and see her at six months postop.      SL/ms

## 2017-07-26 NOTE — LETTER
7/26/2017      RE: Nancy Elizondo  [address] [address] [address]       History of Present Illness:  This is a 52-year-old woman who recently underwent left stapes surgery. Her procedure was on May 15 of this year. She reports that there is some improvement in her hearing on the left side, but it has been slow and rather gradual. She does not have any symptoms of dizziness or vertigo. She does complain of significant dysgeusia.    Exam: Exam today under the operating microscope shows both tympanic membranes looked anatomically normal. Her Lazo lateralizes to the right and her Rinne is negative on the right and positive on the left.  That air conduction is better than bone on the left. The patient now reports that the left ear is the better hearing ear. There is no evidence of nystagmus. The facial nerve is a House Brackmann class I bilaterally.    Medications:  There has been no change in medication. I had given her a prescription for nystatin which has not significantly improved the white plaque on the tongue. It is greater on the left side than on the right side.    Audiogram: Audiogram confirms that hearing threshold has improved slightly in the left ear, although it is not dramatic. Her speech reception threshold is 30 on the left, 50 on the right. Her word recognition remains 100% bilaterally.    Impression: Normal healing following left stapes surgery. This is an acceptable, although not outstanding, result.    Plan: Some of her symptoms of tongue coating may be from changes in secretion level. I have taken a scraping again today and sent it for fungal culture. I am unclear whether this really represents oral thrush. After the nystatin, I suspect that this has resolved and that her dysgeusia is on the basis of manipulation of the chorda tympani nerve. I will continue to follow her clinically and see her at six months postop.      SL/ms    Suleiman Joseph MD

## 2017-07-26 NOTE — MR AVS SNAPSHOT
After Visit Summary   2017    Nancy Elizondo    MRN: [id number]           Patient Information     Date Of Birth          1965        Visit Information        Provider Department      2017 10:00 AM Madyson Ochao AuD [facility] Audiology        Today's Diagnoses     Mixed hearing loss, bilateral    -  1       Follow-ups after your visit        Your next 10 appointments already scheduled     2018 10:45 AM CST   (Arrive by 10:30 AM)   Return Visit with Suleiman Joseph MD   [facility] Ear Nose and Throat ([facility])    [address] [place] [address] [phone]              Who to contact     Please call your clinic at [phone] to:    Ask questions about your health    Make or cancel appointments    Discuss your medicines    Learn about your test results    Speak to your doctor   If you have compliments or concerns about an experience at your clinic, or if you wish to file a complaint, please contact [facility] Physicians Patient Relations at [phone] or email us at [email or web address]         Additional Information About Your Visit        MyChart Information     frestylt is an electronic gateway that provides easy, online access to your medical records. With Hyperfair, you can request a clinic appointment, read your test results, renew a prescription or communicate with your care team.     To sign up for frestylt visit the website at [email or web address]   You will be asked to enter the access code listed below, as well as some personal information. Please follow the directions to create your username and password.     Your access code is: [email or web address]  Expires: 10/24/2017 12:16 PM     Your access code will  in 90 days. If you need help or a new code, please contact your [facility] Clinic or call [phone] for assistance.        Care EveryWhere  ID     This is your Care EveryWhere ID. This could be used by other organizations to access your [place] medical records  [id number]         Blood Pressure from Last 3 Encounters:   05/15/17 107/75    Weight from Last 3 Encounters:   07/26/17 74.8 kg (165 lb)   06/07/17 78 kg (172 lb)   05/15/17 77.1 kg (170 lb)              We Performed the Following     AUDIOGRAM/TYMPANOGRAM - INTERFACE     AUDIOLOGY ADULT REFERRAL     [facility] ([id number])        Primary Care Provider Office Phone # Fax #    [facility] [phone]       [address] [place] [address]        Equal Access to Services     SAMANTHA CORONEL : Hadii aad ku hadasho Soomaali, waaxda luqadaha, qaybta kaalmada adeegyada, waxilan noblein haydarrel booker . So [facility] [phone].    ATENCIÓN: Si habla español, tiene a pretty disposición servicios gratuitos de asistencia lingüística. Ll[facility] [phone].    We comply with applicable federal civil rights laws and [place] laws. We do not discriminate on the basis of race, color, national origin, age, disability sex, sexual orientation or gender identity.            Thank you!     Thank you for choosing [facility] AUDIOLOGY  for your care. Our goal is always to provide you with excellent care. Hearing back from our patients is one way we can continue to improve our services. Please take a few minutes to complete the written survey that you may receive in the mail after your visit with us. Thank you!             Your Updated Medication List - Protect others around you: Learn how to safely use, store and throw away your medicines at www.disposemymeds.org.          This list is accurate as of: 7/26/17  2:00 PM.  Always use your most recent med list.                   Brand Name Dispense Instructions for use Diagnosis    CAFFEINE EXTRA STRENGTH PO      Take 200 mg by mouth        EFFEXOR PO      Take 37.5 mg by mouth daily 1 or 2 daily         HYDROcodone-acetaminophen 5-325 MG per tablet    NORCO    20 tablet    Take 1 tablet by mouth every 6 hours as needed for moderate to severe pain    S/P ear surgery       LISINOPRIL PO      Take 20 mg by mouth        MULTIVITAMIN ADULT PO           PROVIGIL PO      Take 200 mg by mouth 3 times daily

## 2017-07-26 NOTE — PATIENT INSTRUCTIONS
You will need  to schedule a follow up appointment in 6 months for an ear check.  You will be called with the results of the tongue culture.   Please call our clinic for any questions,concerns,or worsening symptoms.      Clinic #[phone]       Option 3  for the triage nurse.

## 2017-07-26 NOTE — LETTER
7/26/2017       RE: Nancy Elizondo  [address] [address] [address]     Dear Colleague,    Thank you for referring your patient, Nancy Elizondo, to the [facility] EAR NOSE AND THROAT at [facility]. Please see a copy of my visit note below.    History of Present Illness:  This is a 52-year-old woman who recently underwent left stapes surgery. Her procedure was on May 15 of this year. She reports that there is some improvement in her hearing on the left side, but it has been slow and rather gradual. She does not have any symptoms of dizziness or vertigo. She does complain of significant dysgeusia.    Exam: Exam today under the operating microscope shows both tympanic membranes looked anatomically normal. Her Lazo lateralizes to the right and her Rinne is negative on the right and positive on the left.  That air conduction is better than bone on the left. The patient now reports that the left ear is the better hearing ear. There is no evidence of nystagmus. The facial nerve is a House Brackmann class I bilaterally.    Medications:  There has been no change in medication. I had given her a prescription for nystatin which has not significantly improved the white plaque on the tongue. It is greater on the left side than on the right side.    Audiogram: Audiogram confirms that hearing threshold has improved slightly in the left ear, although it is not dramatic. Her speech reception threshold is 30 on the left, 50 on the right. Her word recognition remains 100% bilaterally.    Impression: Normal healing following left stapes surgery. This is an acceptable, although not outstanding, result.    Plan: Some of her symptoms of tongue coating may be from changes in secretion level. I have taken a scraping again today and sent it for fungal culture. I am unclear whether this really represents oral thrush. After the nystatin, I suspect that this has resolved and that her  dysgeusia is on the basis of manipulation of the chorda tympani nerve. I will continue to follow her clinically and see her at six months postop.      SL/ms    Again, thank you for allowing me to participate in the care of your patient.      Sincerely,    Suleiman Joseph MD

## 2017-07-26 NOTE — MR AVS SNAPSHOT
After Visit Summary   7/26/2017    Nancy Elizondo    MRN: [id number]           Patient Information     Date Of Birth          1965        Visit Information        Provider Department      7/26/2017 11:30 AM Suleiman Joseph MD M [facility] Ear Nose and Throat        Today's Diagnoses     Tongue coating    -  1      Care Instructions    You will need  to schedule a follow up appointment in 6 months for an ear check.  You will be called with the results of the tongue culture.   Please call our clinic for any questions,concerns,or worsening symptoms.      Clinic #[phone]       Option 3  for the triage nurse.          Follow-ups after your visit        Your next 10 appointments already scheduled     Jan 26, 2018 10:45 AM CST   (Arrive by 10:30 AM)   Return Visit with MD VALDEZ Pierce [facility] Ear Nose and Throat ([facility])    [address] [place] [address] [phone]              Who to contact     Please call your clinic at [phone] to:    Ask questions about your health    Make or cancel appointments    Discuss your medicines    Learn about your test results    Speak to your doctor   If you have compliments or concerns about an experience at your clinic, or if you wish to file a complaint, please contact [facility] Physicians Patient Relations at [phone] or email us at [email or web address]         Additional Information About Your Visit        MyChart Information     StepOnet is an electronic gateway that provides easy, online access to your medical records. With Organic Motion, you can request a clinic appointment, read your test results, renew a prescription or communicate with your care team.     To sign up for StepOnet visit the website at [email or web address]   You will be asked to enter the access code listed below, as well as some personal information. Please follow the directions to  "create your username and password.     Your access code is: [email or web address]  Expires: 10/24/2017 12:16 PM     Your access code will  in 90 days. If you need help or a new code, please contact your [facility] Physicians Clinic or call [phone] for assistance.        Care EveryWhere ID     This is your Care EveryWhere ID. This could be used by other organizations to access your [place] medical records  [id number]        Your Vitals Were     Height BMI (Body Mass Index)                1.61 m (5' 3.39\") 28.87 kg/m2           Blood Pressure from Last 3 Encounters:   05/15/17 107/75    Weight from Last 3 Encounters:   17 74.8 kg (165 lb)   17 78 kg (172 lb)   05/15/17 77.1 kg (170 lb)              We Performed the Following     Fungus Culture, non-blood        Primary Care Provider Office Phone # Fax #    [facility] [phone]       [address] [place] [address]        Equal Access to Services     [facility]: Hadii aad ku hadasho Soomaali, waaxda luqadaha, qaybta kaalmada adeegyada, tl booker . So [facility] [phone].    ATENCIÓN: Si habla español, tiene a pretty disposición servicios gratuitos de asistencia lingüística. Llame al [phone].    We comply with applicable federal civil rights laws and [place] laws. We do not discriminate on the basis of race, color, national origin, age, disability sex, sexual orientation or gender identity.            Thank you!     Thank you for choosing [facility] EAR NOSE AND THROAT  for your care. Our goal is always to provide you with excellent care. Hearing back from our patients is one way we can continue to improve our services. Please take a few minutes to complete the written survey that you may receive in the mail after your visit with us. Thank you!             Your Updated Medication List - Protect others around you: Learn how to safely use, store and throw away your medicines at " www.disposemymeds.org.          This list is accurate as of: 7/26/17 12:16 PM.  Always use your most recent med list.                   Brand Name Dispense Instructions for use Diagnosis    CAFFEINE EXTRA STRENGTH PO      Take 200 mg by mouth        EFFEXOR PO      Take 37.5 mg by mouth daily 1 or 2 daily        HYDROcodone-acetaminophen 5-325 MG per tablet    NORCO    20 tablet    Take 1 tablet by mouth every 6 hours as needed for moderate to severe pain    S/P ear surgery       LISINOPRIL PO      Take 20 mg by mouth        MULTIVITAMIN ADULT PO           PROVIGIL PO      Take 200 mg by mouth 3 times daily

## 2017-07-31 LAB
MICRO REPORT STATUS: NORMAL
SPECIMEN SOURCE: NORMAL
YEAST SPEC QL CULT: NORMAL

## 2017-08-02 ENCOUNTER — CARE COORDINATION (OUTPATIENT)
Dept: OTOLARYNGOLOGY | Facility: CLINIC | Age: 52
End: 2017-08-02

## 2018-02-14 ENCOUNTER — RADIANT APPOINTMENT (OUTPATIENT)
Dept: ULTRASOUND IMAGING | Facility: CLINIC | Age: 53
End: 2018-02-14
Attending: OTOLARYNGOLOGY
Payer: COMMERCIAL

## 2018-02-14 ENCOUNTER — OFFICE VISIT (OUTPATIENT)
Dept: OTOLARYNGOLOGY | Facility: CLINIC | Age: 53
End: 2018-02-14
Payer: COMMERCIAL

## 2018-02-14 DIAGNOSIS — E04.1 THYROID NODULE: Primary | ICD-10-CM

## 2018-02-14 DIAGNOSIS — E04.1 THYROID NODULE: ICD-10-CM

## 2018-02-14 DIAGNOSIS — H80.23 COCHLEAR OTOSCLEROSIS OF BOTH EARS: ICD-10-CM

## 2018-02-14 LAB
T4 FREE SERPL-MCNC: 0.7 NG/DL (ref 0.76–1.46)
TSH SERPL DL<=0.005 MIU/L-ACNC: 0.07 MU/L (ref 0.4–4)

## 2018-02-14 ASSESSMENT — PAIN SCALES - GENERAL: PAINLEVEL: NO PAIN (0)

## 2018-02-14 NOTE — LETTER
2/14/2018       RE: Nancy Elizondo  [address] [id number]     Dear Colleague,    Thank you for referring your patient, Nancy Elizondo, to the [facility] EAR NOSE AND THROAT at [facility]. Please see a copy of my visit note below.    History of Present Illness:  This is a 52-year-old lady who has had bilateral stapes procedures and she notes that she still having difficulty with her hearing. She has problems with her inability to locate where sound is coming from and feels that she has difficulty with communication. The patient has also noted that she has change in voice with a gravelly quality to it and she is known to have a thyroid goiter on both sides. The patient reports that her symptoms are progressing slowly with respect to swallowing.    Medically, she has been doing okay and has no dizziness or vertigo.    Physical Examination:  Exam today shows that both tympanic membranes are anatomically intact. Small amounts of cerumen were removed with an alligator forceps. The patient has been doing well otherwise.  Her Lazo goes left and her Rinne is positive bilaterally.     Audiogram:  Her audiogram shows a pure tone average of around 50 dB bilaterally. Her right ear is clearly worse though and her word recognition scores are 100%.    Impression: Bilateral otosclerosis, now involving mostly the cochlea. Additionally, she reports that she has numbness of her hands and difficulty swallowing from her goiter.     Plan:  This should be further evaluated and I will try to arrange and her referral.    Again, thank you for allowing me to participate in the care of your patient.      Sincerely,  Suleiman Joseph MD

## 2018-02-14 NOTE — LETTER
2/14/2018       RE: Nancy Elizondo  [address] [id number]     Dear Colleague,    Thank you for referring your patient, Nancy Elizondo, to the [facility] EAR NOSE AND THROAT at [facility]. Please see a copy of my visit note below.    History of Present Illness:  This is a 52-year-old lady who has had bilateral stapes procedures and she notes that she still having difficulty with her hearing. She has problems with her inability to locate where sound is coming from and feels that she has difficulty with communication. The patient has also noted that she has change in voice with a gravelly quality to it and she is known to have a thyroid goiter on both sides. The patient reports that her symptoms are progressing slowly with respect to swallowing.    Medically, she has been doing okay and has no dizziness or vertigo.    Physical Examination:  Exam today shows that both tympanic membranes are anatomically intact. Small amounts of cerumen were removed with an alligator forceps. The patient has been doing well otherwise.  Her Lazo goes left and her Rinne is positive bilaterally.     Audiogram:  Her audiogram shows a pure tone average of around 50 dB bilaterally. Her right ear is clearly worse though and her word recognition scores are 100%.    Impression: Bilateral otosclerosis, now involving mostly the cochlea. Additionally, she reports that she has numbness of her hands and difficulty swallowing from her goiter.     Plan:  This should be further evaluated and I will try to arrange and her referral.      Again, thank you for allowing me to participate in the care of your patient.      Sincerely,    Suleiman Joseph MD

## 2018-02-14 NOTE — PATIENT INSTRUCTIONS
You will need a ultra sound of the thyroid and thyroid lab work done. You will be called with the results.  You will have a consult with audiology for hearing aids.   Please call our clinic for any questions,concerns,or worsening symptoms.      Clinic #[phone]       Option 3  for the triage nurse.

## 2018-02-14 NOTE — MR AVS SNAPSHOT
After Visit Summary   2/14/2018    Nancy Elizondo    MRN: [id number]           Patient Information     Date Of Birth          1965        Visit Information        Provider Department      2/14/2018 10:45 AM Suleiman Joseph MD [facility] Ear Nose and Throat        Today's Diagnoses     Thyroid nodule    -  1      Care Instructions    You will need a ultra sound of the thyroid and thyroid lab work done. You will be called with the results.  You will have a consult with audiology for hearing aids.   Please call our clinic for any questions,concerns,or worsening symptoms.      Clinic #[phone]       Option 3  for the triage nurse.          Follow-ups after your visit        Your next 10 appointments already scheduled     Feb 14, 2018 12:45 PM CST   LAB with [facility] ([facility])    [address] [place] [address]  1st Floor  [facility] [address] [phone]           Please do not eat 10-12 hours before your appointment if you are coming in fasting for labs on lipids, cholesterol, or glucose (sugar). This does not apply to pregnant women. Water, hot tea and black coffee (with nothing added) are okay. Do not drink other fluids, diet soda or chew gum.            Feb 14, 2018  2:15 PM CST   US THYROID with [address] [facility] ([facility])    [address] [place] [address]  1st Floor  [facility] [address] [phone]           Please bring a list of your medicines (including vitamins, minerals and over-the-counter drugs). Also, tell your doctor about any allergies you may have. Wear comfortable clothes and leave your valuables at home.  You do not need to do anything special to prepare for your exam.  Please call the Imaging Department at your exam site with any questions.            Feb 15, 2018  1:30 PM CST   Hearing Aid Consult with Sanjeev Boswell   [facility] ([facility])    [address] [place] [address]  [address] [address]   [phone]            Mar 23, 2018  2:00 PM CDT   Hearing Aid Fitting with Sanjeev Boswell [facility] ([facility])    [address] [place] [address]   [phone]            Apr 18, 2018  1:30 PM CDT   (Arrive by 1:15 PM)   Initial Review Program with Sanjeev Boswell [facility] ([facility])    [address] [place] [address]   [phone]              Future tests that were ordered for you today     Open Future Orders        Priority Expected Expires Ordered    TSH with free T4 reflex Routine  2/14/2019 2/14/2018    US Thyroid Routine  2/14/2019 2/14/2018     Thyroid Biopsy Routine  2/14/2019 2/14/2018            Who to contact     Please call your clinic at [phone] to:    Ask questions about your health    Make or cancel appointments    Discuss your medicines    Learn about your test results    Speak to your doctor            Additional Information About Your Visit        [employer] Software Information     go2 media gives you secure access to your electronic health record. If you see a primary care provider, you can also send messages to your care team and make appointments. If you have questions, please call your primary care clinic.  If you do not have a primary care provider, please call [phone] and they will assist you.      go2 media is an electronic gateway that provides easy, online access to your medical records. With go2 media, you can request a clinic appointment, read your test results, renew a prescription or communicate with your care team.     To access your existing account, please contact your [facility] or call [phone] for assistance.        Care EveryWhere ID     This is your Care EveryWhere ID. This could be used by other organizations to access your [facility]  records  [id number]         Blood Pressure from Last 3 Encounters:   05/15/17 107/75    Weight from Last 3 Encounters:   07/26/17 74.8 kg (165 lb)   06/07/17 78 kg (172 lb)   05/15/17 77.1 kg (170 lb)                 Today's Medication Changes          These changes are accurate as of 2/14/18 12:25 PM.  If you have any questions, ask your nurse or doctor.               Stop taking these medicines if you haven't already. Please contact your care team if you have questions.     HYDROcodone-acetaminophen 5-325 MG per tablet   Commonly known as:  NORCO   Stopped by:  Suleiman Joseph MD                    Primary Care Provider Office Phone # Fax #    [facility] [phone]       [address] [place] [id number]        Equal Access to Services     [facility]: Traci madden Soamaury, waaxda luqadaha, qaybta kaalmada natalia, tl booker . So [facility] [phone].    ATENCIÓN: Si habla español, tiene a pretty disposición servicios gratuitos de asistencia lingüística. [facility] [phone].    We comply with applicable federal civil rights laws and [place] laws. We do not discriminate on the basis of race, color, national origin, age, disability, sex, sexual orientation, or gender identity.            Thank you!     Thank you for choosing [facility] EAR NOSE AND THROAT  for your care. Our goal is always to provide you with excellent care. Hearing back from our patients is one way we can continue to improve our services. Please take a few minutes to complete the written survey that you may receive in the mail after your visit with us. Thank you!             Your Updated Medication List - Protect others around you: Learn how to safely use, store and throw away your medicines at www.disposemymeds.org.          This list is accurate as of 2/14/18 12:25 PM.  Always use your most recent med list.                   Brand Name Dispense Instructions for use Diagnosis     CAFFEINE EXTRA STRENGTH PO      Take 200 mg by mouth        EFFEXOR PO      Take 37.5 mg by mouth daily 1 or 2 daily        LISINOPRIL PO      Take 20 mg by mouth        MULTIVITAMIN ADULT PO           PROVIGIL PO      Take 200 mg by mouth 3 times daily

## 2018-02-14 NOTE — PROGRESS NOTES
History of Present Illness:  This is a 52-year-old lady who has had bilateral stapes procedures and she notes that she still having difficulty with her hearing. She has problems with her inability to locate where sound is coming from and feels that she has difficulty with communication. The patient has also noted that she has change in voice with a gravelly quality to it and she is known to have a thyroid goiter on both sides. The patient reports that her symptoms are progressing slowly with respect to swallowing.    Medically, she has been doing okay and has no dizziness or vertigo.    Physical Examination:  Exam today shows that both tympanic membranes are anatomically intact. Small amounts of cerumen were removed with an alligator forceps. The patient has been doing well otherwise.  Her Lazo goes left and her Rinne is positive bilaterally.     Audiogram:  Her audiogram shows a pure tone average of around 50 dB bilaterally. Her right ear is clearly worse though and her word recognition scores are 100%.    Impression: Bilateral otosclerosis, now involving mostly the cochlea. Additionally, she reports that she has numbness of her hands and difficulty swallowing from her goiter.     Plan:  This should be further evaluated and I will try to arrange and her referral.      SYED/ms

## 2018-02-14 NOTE — LETTER
2/14/2018      RE: Nancy Elizondo  [address] [address] [address]       History of Present Illness:  This is a 52-year-old lady who has had bilateral stapes procedures and she notes that she still having difficulty with her hearing. She has problems with her inability to locate where sound is coming from and feels that she has difficulty with communication. The patient has also noted that she has change in voice with a gravelly quality to it and she is known to have a thyroid goiter on both sides. The patient reports that her symptoms are progressing slowly with respect to swallowing.    Medically, she has been doing okay and has no dizziness or vertigo.    Physical Examination:  Exam today shows that both tympanic membranes are anatomically intact. Small amounts of cerumen were removed with an alligator forceps. The patient has been doing well otherwise.  Her Lazo goes left and her Rinne is positive bilaterally.     Audiogram:  Her audiogram shows a pure tone average of around 50 dB bilaterally. Her right ear is clearly worse though and her word recognition scores are 100%.    Impression: Bilateral otosclerosis, now involving mostly the cochlea. Additionally, she reports that she has numbness of her hands and difficulty swallowing from her goiter.     Plan:  This should be further evaluated and I will try to arrange and her referral.      SYED/ms Suleiman Joseph MD

## 2018-02-16 ENCOUNTER — CARE COORDINATION (OUTPATIENT)
Dept: OTOLARYNGOLOGY | Facility: CLINIC | Age: 53
End: 2018-02-16

## 2018-02-16 NOTE — PROGRESS NOTES
Dr. Joseph has reviewed the thyroid ultra sound and TSH.  Pt will have biopsy scheduled. Pt should have consult with Dr. Reece for surgical discussion. PCP should be notified of results.  Above message left on pt voice mail- awaiting call back for PCP information.  ENT  will contact pt regarding appointments.  Carmen Frietas RN  ENT Care Coordinator   Otology  [phone]  2/16/2018 12:44 PM  2-19-18 pt notified of above and understood.  Reinforced that PCP should be following  for any medication management of the thyroid. Pt will have thyroid bx 3-13-18 at 11 AM, Follow up with Dr. Reece 3-19-18 at 3PM to review path and discuss if surgery needed.  Above information/results will be faxed to PCP/PAC Dr. Jose Casanova and PAC Zoe Tellez at [facility] fax [phone]. Phone [phone]  Carmen Freitas RN  ENT Care Coordinator   Otology  [phone]  2/19/2018 12:52 PM

## 2018-02-18 ENCOUNTER — HEALTH MAINTENANCE LETTER (OUTPATIENT)
Age: 53
End: 2018-02-18

## 2018-02-19 DIAGNOSIS — E04.1 THYROID NODULE: Primary | ICD-10-CM

## 2018-03-13 ENCOUNTER — RADIANT APPOINTMENT (OUTPATIENT)
Dept: ULTRASOUND IMAGING | Facility: CLINIC | Age: 53
End: 2018-03-13
Attending: OTOLARYNGOLOGY
Payer: COMMERCIAL

## 2018-03-13 DIAGNOSIS — E04.1 THYROID NODULE: ICD-10-CM

## 2018-03-13 RX ORDER — LIDOCAINE HYDROCHLORIDE 10 MG/ML
5 INJECTION, SOLUTION INFILTRATION; PERINEURAL ONCE
Status: COMPLETED | OUTPATIENT
Start: 2018-03-13 | End: 2018-03-13

## 2018-03-13 RX ADMIN — LIDOCAINE HYDROCHLORIDE 3 ML: 10 INJECTION, SOLUTION INFILTRATION; PERINEURAL at 12:25

## 2018-03-13 NOTE — MR AVS SNAPSHOT
MRN:[id number]                      After Visit Summary   3/13/2018    Nancy Elizondo    MRN: [id number]           Visit Information        Provider Department      3/13/2018 11:00 AM  PROCEDURAL RAD;  IMAGING NURSE; US3 [facility]           Review of your medicines          These changes are accurate as of 3/13/18 11:20 AM.  If you have any questions, ask your nurse or doctor.               CONTINUE these medicines which have NOT CHANGED        Dose / Directions    CAFFEINE EXTRA STRENGTH PO        Dose:  200 mg   Take 200 mg by mouth   Refills:  0       EFFEXOR PO        Dose:  37.5 mg   Take 37.5 mg by mouth daily 1 or 2 daily   Refills:  0       LISINOPRIL PO        Dose:  20 mg   Take 20 mg by mouth   Refills:  0       MULTIVITAMIN ADULT PO        Refills:  0       PROVIGIL PO        Dose:  200 mg   Take 200 mg by mouth 3 times daily   Refills:  0                Protect others around you: Learn how to safely use, store and throw away your medicines at www.disposemymeds.org.         Follow-ups after your visit        Your next 10 appointments already scheduled     Mar 19, 2018  3:00 PM CDT   (Arrive by 2:45 PM)   New Patient Visit with Natalia Reece MD   [facility] Ear Nose and Throat ([facility] [place])    [address] [place] [address] [phone]               Care Instructions        Further instructions from your care team       Directions for after your Thyroid Fine Needle Aspiration:    You can remove your bandage within a few hours.  The site of the biopsy may be sore for a day or two after the procedure. You can take over-the-counter pain medicine if needed.  Notify your doctor if you have any of the following:    Fever above 101 degrees    Swelling in the area of the biopsy    Redness or leaking from the biopsy site  Your doctor will notify you of the results in 2-3 days.     Additional Information About  Your Visit        Ideedockhart Information     [employer] gives you secure access to your electronic health record. If you see a primary care provider, you can also send messages to your care team and make appointments. If you have questions, please call your primary care clinic.  If you do not have a primary care provider, please call [phone] and they will assist you.      [employer] is an electronic gateway that provides easy, online access to your medical records. With [employer], you can request a clinic appointment, read your test results, renew a prescription or communicate with your care team.     To access your existing account, please contact your [facility] or call [phone] for assistance.        Care EveryWhere ID     This is your Care EveryWhere ID. This could be used by other organizations to access your [facility] medical records  [id number]         Primary Care Provider Office Phone # Fax #    [facility] [phone]      Equal Access to Services     SAMANTHA CORONEL : Hadii cate sykeso Soamaury, waaxda luqadaha, qaybta kaalmada adeegyada, tl booker . So [facility] [phone].    ATENCIÓN: Si habla español, tiene a pretty disposición servicios gratuitos de asistencia lingüística. Llame al [phone].    We comply with applicable federal civil rights laws and [place] laws. We do not discriminate on the basis of race, color, national origin, age, disability, sex, sexual orientation, or gender identity.            Thank you!     Thank you for choosing [facility] for your care. Our goal is always to provide you with excellent care. Hearing back from our patients is one way we can continue to improve our services. Please take a few minutes to complete the written survey that you may receive in the mail after you visit with us. Thank you!             Medication List: This is a list of all your medications and when to take them. Check  marks below indicate your daily home schedule. Keep this list as a reference.          This list is accurate as of 3/13/18 11:20 AM.  Always use your most recent med list.               Medications           Morning Afternoon Evening Bedtime As Needed    CAFFEINE EXTRA STRENGTH PO   Take 200 mg by mouth                                EFFEXOR PO   Take 37.5 mg by mouth daily 1 or 2 daily                                LISINOPRIL PO   Take 20 mg by mouth                                MULTIVITAMIN ADULT PO                                PROVIGIL PO   Take 200 mg by mouth 3 times daily

## 2018-03-14 LAB — COPATH REPORT: NORMAL

## 2018-03-19 ENCOUNTER — OFFICE VISIT (OUTPATIENT)
Dept: OTOLARYNGOLOGY | Facility: CLINIC | Age: 53
End: 2018-03-19
Payer: COMMERCIAL

## 2018-03-19 VITALS — HEIGHT: 63 IN | BODY MASS INDEX: 30.48 KG/M2 | WEIGHT: 172 LBS

## 2018-03-19 DIAGNOSIS — E04.9 THYROID GOITER: Primary | ICD-10-CM

## 2018-03-19 PROBLEM — G47.419 NARCOLEPSY: Status: ACTIVE | Noted: 2018-03-19

## 2018-03-19 PROBLEM — E04.1 THYROID NODULE: Status: ACTIVE | Noted: 2018-03-19

## 2018-03-19 PROBLEM — H90.0 CONDUCTIVE HEARING LOSS, BILATERAL: Status: ACTIVE | Noted: 2018-03-19

## 2018-03-19 PROBLEM — I10 HTN (HYPERTENSION): Status: ACTIVE | Noted: 2018-03-19

## 2018-03-19 ASSESSMENT — PAIN SCALES - GENERAL: PAINLEVEL: NO PAIN (0)

## 2018-03-19 NOTE — PATIENT INSTRUCTIONS
Follow up is on an as needed basis. Please call our triage RN at [phone], for questions or concerns.

## 2018-03-19 NOTE — NURSING NOTE
"Chief Complaint   Patient presents with     Consult     Consultation for thyroid nodule surgery      Height 1.6 m (5' 3\"), weight 78 kg (172 lb).    Boni Lau    "

## 2018-03-19 NOTE — LETTER
3/19/2018       RE: Nancy Elizondo  [address] [address] [address]     Dear Colleague,    Thank you for referring your patient, Nancy Elizondo, to the [facility] EAR NOSE AND THROAT at [facility]. Please see a copy of my visit note below.    Service Date: 03/19/2018      REASON FOR CONSULTATION:  I was asked by Dr. Suleiman Joseph to see this patient for thyroid nodules.      Greater than 40 minutes was spent in the caring consultation this patient.       This is a 52-year-old female whose bilateral hearing loss is being evaluated and treated by Dr. Suleiman Joseph.  During the workup and evaluation for such, the patient was noted to have a nodule on her thyroid.  This then led to an ultrasound that identified bilateral thyroid nodularities, largest on the right measuring 2.1 cm, largest on the left measuring 1.2 cm.   The one on the right was of concern.  The patient then underwent an ultrasound-guided biopsy of the thyroid nodule.  Both nodules were benign.  Thyroid function tests were normal.      Regarding the thyroid nodules, the patient denies any symptoms of hypo- or hyperthyroidism.  She denies any compressive symptoms.  She has no problems with voice quality, inspiration or swallowing.  She has no family history of thyroid carcinoma.  No previous history of thyroid carcinoma.  No head and neck radiation exposure.      PAST MEDICAL HISTORY, SURGICAL HISTORY AND MEDICATIONS:  Have been reviewed and are available in the EMR.      REVIEW OF SYSTEMS:  A 10-point review of systems is pertinent for that noted in the HPI.      PHYSICAL EXAMINATION:  On physical exam the thyroid gland is palpable.  Superior and inferior borders are easily palpable.  There is bilateral nodularity identified in the thyroid gland, but they both feel quite soft.  There is no cervical lymphadenopathy.      Laboratory data, radiographic images and pathology are discussed above.       ASSESSMENT:  Benign multinodular goiter.      PLAN:  At this point, because this is not symptomatic and there is no evidence of malignancy.  I would not recommend surgery.  I would recommend the patient follow up with an ultrasound in 2 years or prior to that if these become symptomatic.  She can follow up with me on an as-needed basis and follow up with a primary care hereafter.      JESSICA CORTEZ MD       D: 2018   T: 2018   MT: nh      Name:     PATRICK FRANKEL   MRN:      [id number]        Account:      [id number]   :      1965           Service Date: 2018      Document: [id number]

## 2018-03-19 NOTE — MR AVS SNAPSHOT
"              After Visit Summary   3/19/2018    Nancy Elizondo    MRN: [id number]           Patient Information     Date Of Birth          1965        Visit Information        Provider Department      3/19/2018 3:00 PM Natalia Reece MD [facility]        Today's Diagnoses     Thyroid goiter    -  1      Care Instructions    Follow up is on an as needed basis. Please call our triage RN at [phone], for questions or concerns.            Follow-ups after your visit        Who to contact     Please call your clinic at [phone] to:    Ask questions about your health    Make or cancel appointments    Discuss your medicines    Learn about your test results    Speak to your doctor            Additional Information About Your Visit        [employer] Information     [employer] gives you secure access to your electronic health record. If you see a primary care provider, you can also send messages to your care team and make appointments. If you have questions, please call your primary care clinic.  If you do not have a primary care provider, please call [phone] and they will assist you.      [employer] is an electronic gateway that provides easy, online access to your medical records. With [employer], you can request a clinic appointment, read your test results, renew a prescription or communicate with your care team.     To access your existing account, please contact your [facility] or call [phone] for assistance.        Care EveryWhere ID     This is your Care EveryWhere ID. This could be used by other organizations to access your [facility] medical records  [id number]        Your Vitals Were     Height BMI (Body Mass Index)                1.6 m (5' 3\") 30.47 kg/m2           Blood Pressure from Last 3 Encounters:   05/15/17 107/75    Weight from Last 3 Encounters:   03/19/18 78 kg (172 lb)   07/26/17 74.8 kg (165 lb)   06/07/17 78 kg (172 lb)            "    Primary Care Provider Office Phone # Fax #    [facility] [phone]       [address] [place] [id number]        Equal Access to Services     SAMANTHA CORONEL : Hadii aad ku hadstephanieo Sojudali, waisabellda luqadaha, qaybta kaalmada adeannabelleda, tl kidd laBrockdarrel ivy. So [facility] [phone].    ATENCIÓN: Si habla español, tiene a pretty disposición servicios gratuitos de asistencia lingüística. Llame al [phone].    We comply with applicable federal civil rights laws and [place] laws. We do not discriminate on the basis of race, color, national origin, age, disability, sex, sexual orientation, or gender identity.            Thank you!     Thank you for choosing [facility]  for your care. Our goal is always to provide you with excellent care. Hearing back from our patients is one way we can continue to improve our services. Please take a few minutes to complete the written survey that you may receive in the mail after your visit with us. Thank you!             Your Updated Medication List - Protect others around you: Learn how to safely use, store and throw away your medicines at www.disposemymeds.org.          This list is accurate as of 3/19/18 11:59 PM.  Always use your most recent med list.                   Brand Name Dispense Instructions for use Diagnosis    CAFFEINE EXTRA STRENGTH PO      Take 200 mg by mouth        EFFEXOR PO      Take 37.5 mg by mouth daily 1 or 2 daily        LISINOPRIL PO      Take 20 mg by mouth        MULTIVITAMIN ADULT PO           PROVIGIL PO      Take 200 mg by mouth 3 times daily

## 2018-04-07 NOTE — PROGRESS NOTES
Service Date: 03/19/2018      REASON FOR CONSULTATION:  I was asked by Dr. Suleiman Joseph to see this patient for thyroid nodules.      Greater than 40 minutes was spent in the caring consultation this patient.       This is a 52-year-old female whose bilateral hearing loss is being evaluated and treated by Dr. Suleiman Joseph.  During the workup and evaluation for such, the patient was noted to have a nodule on her thyroid.  This then led to an ultrasound that identified bilateral thyroid nodularities, largest on the right measuring 2.1 cm, largest on the left measuring 1.2 cm.   The one on the right was of concern.  The patient then underwent an ultrasound-guided biopsy of the thyroid nodule.  Both nodules were benign.  Thyroid function tests were normal.      Regarding the thyroid nodules, the patient denies any symptoms of hypo- or hyperthyroidism.  She denies any compressive symptoms.  She has no problems with voice quality, inspiration or swallowing.  She has no family history of thyroid carcinoma.  No previous history of thyroid carcinoma.  No head and neck radiation exposure.      PAST MEDICAL HISTORY, SURGICAL HISTORY AND MEDICATIONS:  Have been reviewed and are available in the EMR.      REVIEW OF SYSTEMS:  A 10-point review of systems is pertinent for that noted in the HPI.      PHYSICAL EXAMINATION:  On physical exam the thyroid gland is palpable.  Superior and inferior borders are easily palpable.  There is bilateral nodularity identified in the thyroid gland, but they both feel quite soft.  There is no cervical lymphadenopathy.      Laboratory data, radiographic images and pathology are discussed above.      ASSESSMENT:  Benign multinodular goiter.      PLAN:  At this point, because this is not symptomatic and there is no evidence of malignancy.  I would not recommend surgery.  I would recommend the patient follow up with an ultrasound in 2 years or prior to that if these become symptomatic.  She can  follow up with me on an as-needed basis and follow up with a primary care hereafter.         JESSICA CORTEZ MD             D: 2018   T: 2018   MT: nh      Name:     PATRICK FRANKEL   MRN:      [id number]        Account:      [id number]   :      1965           Service Date: 2018      Document: [id number]

## 2019-11-06 ENCOUNTER — HEALTH MAINTENANCE LETTER (OUTPATIENT)
Age: 54
End: 2019-11-06

## 2020-11-29 ENCOUNTER — HEALTH MAINTENANCE LETTER (OUTPATIENT)
Age: 55
End: 2020-11-29

## 2021-09-19 ENCOUNTER — HEALTH MAINTENANCE LETTER (OUTPATIENT)
Age: 56
End: 2021-09-19

## 2021-11-14 ENCOUNTER — HEALTH MAINTENANCE LETTER (OUTPATIENT)
Age: 56
End: 2021-11-14

## 2022-01-09 ENCOUNTER — HEALTH MAINTENANCE LETTER (OUTPATIENT)
Age: 57
End: 2022-01-09

## 2022-11-21 ENCOUNTER — HEALTH MAINTENANCE LETTER (OUTPATIENT)
Age: 57
End: 2022-11-21

## 2023-04-16 ENCOUNTER — HEALTH MAINTENANCE LETTER (OUTPATIENT)
Age: 58
End: 2023-04-16

## 2023-11-26 ENCOUNTER — HEALTH MAINTENANCE LETTER (OUTPATIENT)
Age: 58
End: 2023-11-26

## (undated) DEVICE — SYR 05ML LL W/O NDL

## (undated) DEVICE — DRSG COTTON BALL 6PK LCB62

## (undated) DEVICE — LINEN TOWEL PACK X6 WHITE 5487

## (undated) DEVICE — LINEN TOWEL PACK X5 5464

## (undated) DEVICE — DRAPE MICROSCOPE LEICA 54X150" AR8033650

## (undated) DEVICE — ESU ELEC BLADE 2.75" COATED/INSULATED E1455

## (undated) DEVICE — BLADE KNIFE BEAVER MINI BEAVER6400

## (undated) DEVICE — SYR 10ML SLIP TIP W/O NDL 303134

## (undated) DEVICE — DECANTER BAG 2002S

## (undated) DEVICE — NIM ELEC SUBDERMAL NDL 3PAIR/BOX

## (undated) DEVICE — PREP SKIN SCRUB TRAY 4461A

## (undated) DEVICE — WIPE INSTRUMENT MEROCEL 400200

## (undated) DEVICE — IMPLANTABLE DEVICE: Type: IMPLANTABLE DEVICE | Site: EAR | Status: NON-FUNCTIONAL

## (undated) DEVICE — NDL ANGIOCATH 14GA 1.25" 4048

## (undated) DEVICE — SPONGE SURGIFOAM 100 1974

## (undated) DEVICE — SOL WATER IRRIG 1000ML BOTTLE 2F7114

## (undated) DEVICE — SOL RINGERS LACTATED 1000ML BAG 2B2324X

## (undated) DEVICE — GLOVE PROTEXIS POWDER FREE SMT 7.0  2D72PT70X

## (undated) DEVICE — ESU GROUND PAD ADULT W/CORD E7507

## (undated) DEVICE — DRAPE EAR CHRONIC LATEX FREE 3609

## (undated) DEVICE — SYR 10ML LL W/O NDL

## (undated) DEVICE — SUCTION MANIFOLD NEPTUNE 2 SYS 4 PORT 0702-020-000

## (undated) DEVICE — PROBE OTO SHORT ANGLED 14320-1

## (undated) DEVICE — PACK EAR CUSTOM ASC

## (undated) DEVICE — DRAPE WARMER 66X44" ORS-300

## (undated) DEVICE — CUP AND LID 2PK 2OZ STERILE  SSK9006A

## (undated) DEVICE — PREP POVIDONE IODINE SCRUB 7.5% 120ML

## (undated) DEVICE — SU CHROMIC 6-0 S-14DA 18" 1791G

## (undated) DEVICE — BLADE KNIFE BEAVER TYMPANOPLASTY 2.5MM W/60D DOWN 377200

## (undated) DEVICE — PREP POVIDONE IODINE SOLUTION 10% 120ML

## (undated) DEVICE — SU CHROMIC 5-0 P-3 18" 687G

## (undated) DEVICE — DRSG TEGADERM 2 3/8X2 3/4" 1624W

## (undated) DEVICE — SPECIMEN CONTAINER 3OZ

## (undated) RX ORDER — ACETAMINOPHEN 325 MG/1
TABLET ORAL
Status: DISPENSED
Start: 2017-05-15

## (undated) RX ORDER — PROPOFOL 10 MG/ML
INJECTION, EMULSION INTRAVENOUS
Status: DISPENSED
Start: 2017-05-15

## (undated) RX ORDER — FENTANYL CITRATE 50 UG/ML
INJECTION, SOLUTION INTRAMUSCULAR; INTRAVENOUS
Status: DISPENSED
Start: 2017-05-15

## (undated) RX ORDER — EPINEPHRINE NASAL SOLUTION 1 MG/ML
SOLUTION NASAL
Status: DISPENSED
Start: 2017-05-15

## (undated) RX ORDER — DEXAMETHASONE SODIUM PHOSPHATE 4 MG/ML
INJECTION, SOLUTION INTRA-ARTICULAR; INTRALESIONAL; INTRAMUSCULAR; INTRAVENOUS; SOFT TISSUE
Status: DISPENSED
Start: 2017-05-15

## (undated) RX ORDER — LIDOCAINE HYDROCHLORIDE 10 MG/ML
INJECTION, SOLUTION EPIDURAL; INFILTRATION; INTRACAUDAL; PERINEURAL
Status: DISPENSED
Start: 2018-03-13

## (undated) RX ORDER — BACITRACIN 500 [USP'U]/G
OINTMENT OPHTHALMIC
Status: DISPENSED
Start: 2017-05-15

## (undated) RX ORDER — PHENYLEPHRINE HCL IN 0.9% NACL 1 MG/10 ML
SYRINGE (ML) INTRAVENOUS
Status: DISPENSED
Start: 2017-05-15

## (undated) RX ORDER — ONDANSETRON 2 MG/ML
INJECTION INTRAMUSCULAR; INTRAVENOUS
Status: DISPENSED
Start: 2017-05-15

## (undated) RX ORDER — GABAPENTIN 300 MG/1
CAPSULE ORAL
Status: DISPENSED
Start: 2017-05-15